# Patient Record
Sex: FEMALE | Race: BLACK OR AFRICAN AMERICAN | Employment: OTHER | URBAN - METROPOLITAN AREA
[De-identification: names, ages, dates, MRNs, and addresses within clinical notes are randomized per-mention and may not be internally consistent; named-entity substitution may affect disease eponyms.]

---

## 2017-07-11 ENCOUNTER — HOSPITAL ENCOUNTER (EMERGENCY)
Age: 77
Discharge: HOME | End: 2017-07-11
Attending: FAMILY MEDICINE

## 2017-07-11 VITALS
HEIGHT: 62 IN | WEIGHT: 220 LBS | OXYGEN SATURATION: 96 % | SYSTOLIC BLOOD PRESSURE: 203 MMHG | BODY MASS INDEX: 40.48 KG/M2 | TEMPERATURE: 97 F | HEART RATE: 80 BPM | RESPIRATION RATE: 16 BRPM | DIASTOLIC BLOOD PRESSURE: 79 MMHG

## 2017-07-11 DIAGNOSIS — M25.552 PAIN OF LEFT HIP JOINT: Primary | ICD-10-CM

## 2017-07-11 RX ORDER — LANOLIN ALCOHOL/MO/W.PET/CERES
CREAM (GRAM) TOPICAL
COMMUNITY
End: 2019-07-15

## 2017-07-11 NOTE — DISCHARGE INSTRUCTIONS
Hip Pain: Care Instructions  Your Care Instructions  Hip pain may be caused by many things, including overuse, a fall, or a twisting movement. Another cause of hip pain is arthritis. Your pain may increase when you stand up, walk, or squat. The pain may come and go or may be constant. Home treatment can help relieve hip pain, swelling, and stiffness. If your pain is ongoing, you may need more tests and treatment. Follow-up care is a key part of your treatment and safety. Be sure to make and go to all appointments, and call your doctor if you are having problems. Its also a good idea to know your test results and keep a list of the medicines you take. How can you care for yourself at home? · Take pain medicines exactly as directed. ¨ If the doctor gave you a prescription medicine for pain, take it as prescribed. ¨ If you are not taking a prescription pain medicine, ask your doctor if you can take an over-the-counter medicine. · Rest and protect your hip. Take a break from any activity, including standing or walking, that may cause pain. · Put ice or a cold pack against your hip for 10 to 20 minutes at a time. Try to do this every 1 to 2 hours for the next 3 days (when you are awake) or until the swelling goes down. Put a thin cloth between the ice and your skin. · Sleep on your healthy side with a pillow between your knees, or sleep on your back with pillows under your knees. · If there is no swelling, you can put moist heat, a heating pad, or a warm cloth on your hip. Do gentle stretching exercises to help keep your hip flexible. · Learn how to prevent falls. Have your vision and hearing checked regularly. Wear slippers or shoes with a nonskid sole. · Stay at a healthy weight. · Wear comfortable shoes. When should you call for help? Call 911 anytime you think you may need emergency care. For example, call if:  · You have sudden chest pain and shortness of breath, or you cough up blood.   · You are not able to stand or walk or bear weight. · Your buttocks, legs, or feet feel numb or tingly. · Your leg or foot is cool or pale or changes color. · You have severe pain. Call your doctor now or seek immediate medical care if:  · You have signs of infection, such as:  ¨ Increased pain, swelling, warmth, or redness in the hip area. ¨ Red streaks leading from the hip area. ¨ Pus draining from the hip area. ¨ A fever. · You have signs of a blood clot, such as:  ¨ Pain in your calf, back of the knee, thigh, or groin. ¨ Redness and swelling in your leg or groin. · You are not able to bend, straighten, or move your leg normally. · You have trouble urinating or having bowel movements. Watch closely for changes in your health, and be sure to contact your doctor if:  · You do not get better as expected. Where can you learn more? Go to http://shakila-candace.info/. Enter U629 in the search box to learn more about \"Hip Pain: Care Instructions. \"  Current as of: March 20, 2017  Content Version: 11.3  © 4995-3980 [employer]. Care instructions adapted under license by [facility] (which disclaims liability or warranty for this information). If you have questions about a medical condition or this instruction, always ask your healthcare professional. [place] [address] any warranty or liability for your use of this information.

## 2017-07-11 NOTE — UC PROVIDER NOTE
Patient is a 65 y.o. female presenting with hip pain. The history is provided by the patient. Hip Pain    This is a new problem. The current episode started more than 1 week ago. The problem occurs daily. The problem has been resolved. The pain is present in the left hip. The quality of the pain is described as aching. The patient is experiencing no pain. Pertinent negatives include no numbness, full range of motion, no stiffness, no tingling, no itching and no back pain. She has tried OTC pain medications for the symptoms. The treatment provided significant relief. There has been no history of extremity trauma. Past Medical History:   Diagnosis Date    Anemia, iron deficiency     Asthma     Diabetes ([address]     HTN (hypertension)     Hypercholesteremia         History reviewed. No pertinent surgical history. Family History   Problem Relation Age of Onset    Heart Attack      Hypertension      Diabetes          Social History     Social History    Marital status: SINGLE     Spouse name: N/A    Number of children: N/A    Years of education: N/A     Occupational History    Not on file. Social History Main Topics    Smoking status: Current Every Day Smoker     Packs/day: 0.50     Types: Cigarettes    Smokeless tobacco: Not on file    Alcohol use No    Drug use: No    Sexual activity: Not on file     Other Topics Concern    Not on file     Social History Narrative                ALLERGIES: Review of patient's allergies indicates no known allergies. Review of Systems   Constitutional: Negative for activity change. Musculoskeletal: Negative for back pain, gait problem and stiffness. Skin: Negative for itching. Neurological: Negative for tingling and numbness.        Vitals:    07/11/17 1905   BP: 178/79   Pulse: 80   Resp: 16   Temp: 97 °F (36.1 °C)   SpO2: 96%   Weight: 99.8 kg (220 lb)   Height: 5' 2\" (1.575 m)       Physical Exam   Constitutional: She is oriented to person, place, and time. She appears well-developed and well-nourished. Eyes: Conjunctivae and EOM are normal.   Pulmonary/Chest: Effort normal.   Musculoskeletal: Normal range of motion. She exhibits no tenderness. No pain in hip with passive ROM, internal and external rotation   Neurological: She is alert and oriented to person, place, and time. Skin: Skin is warm and dry. Psychiatric: She has a normal mood and affect. Her behavior is normal. Judgment and thought content normal.   Nursing note and vitals reviewed. MDM     Differential Diagnosis; Clinical Impression; Plan:     CLINICAL IMPRESSION:  Pain of left hip joint  (primary encounter diagnosis)    Plan:  1. Continue OTC Aspercream   2. Follow up with PCP  3. Risk of Significant Complications, Morbidity, and/or Mortality:   Presenting problems: Moderate  Diagnostic procedures: Moderate  Management options:   Moderate  Progress:   Patient progress:  Stable      Procedures

## 2018-06-10 ENCOUNTER — OFFICE VISIT (OUTPATIENT)
Dept: URGENT CARE | Age: 78
End: 2018-06-10

## 2018-06-10 VITALS
HEART RATE: 64 BPM | DIASTOLIC BLOOD PRESSURE: 85 MMHG | TEMPERATURE: 98.2 F | WEIGHT: 215 LBS | OXYGEN SATURATION: 98 % | HEIGHT: 62 IN | BODY MASS INDEX: 39.56 KG/M2 | RESPIRATION RATE: 18 BRPM | SYSTOLIC BLOOD PRESSURE: 210 MMHG

## 2018-06-10 DIAGNOSIS — M25.562 ACUTE PAIN OF LEFT KNEE: ICD-10-CM

## 2018-06-10 DIAGNOSIS — M17.0 PRIMARY OSTEOARTHRITIS OF BOTH KNEES: Primary | ICD-10-CM

## 2018-06-10 DIAGNOSIS — I10 MALIGNANT HYPERTENSION: ICD-10-CM

## 2018-06-10 RX ORDER — PREDNISONE 10 MG/1
TABLET ORAL
Qty: 21 TAB | Refills: 0 | Status: SHIPPED | OUTPATIENT
Start: 2018-06-10 | End: 2018-07-12 | Stop reason: ALTCHOICE

## 2018-06-10 RX ORDER — CLONIDINE HYDROCHLORIDE 0.1 MG/1
0.1 TABLET ORAL
Qty: 1 TAB | Refills: 0 | Status: SHIPPED | COMMUNITY
Start: 2018-06-10 | End: 2018-06-10

## 2018-06-10 RX ORDER — HYDROCHLOROTHIAZIDE 25 MG/1
25 TABLET ORAL DAILY
Qty: 30 TAB | Refills: 0 | Status: SHIPPED | OUTPATIENT
Start: 2018-06-10

## 2018-06-10 RX ORDER — AMLODIPINE BESYLATE 10 MG/1
10 TABLET ORAL DAILY
Qty: 30 TAB | Refills: 0 | Status: SHIPPED | OUTPATIENT
Start: 2018-06-10

## 2018-06-10 NOTE — PROGRESS NOTES
Patient is a 77 y.o. female presenting with knee pain. Knee Pain   This is a new problem. The current episode started more than 1 week ago. The problem occurs constantly. The problem has been gradually worsening. Associated symptoms comments: Knee swelling- stiffness- started after  Walking and long standing . The symptoms are aggravated by walking and standing. Nothing relieves the symptoms. She has tried nothing for the symptoms. Past Medical History:   Diagnosis Date    Anemia, iron deficiency     Asthma     Diabetes ([address]     HTN (hypertension)     Hypercholesteremia         History reviewed. No pertinent surgical history. Family History   Problem Relation Age of Onset    Heart Attack Other     Hypertension Other     Diabetes Other         Social History     Social History    Marital status: UNKNOWN     Spouse name: N/A    Number of children: N/A    Years of education: N/A     Occupational History    Not on file. Social History Main Topics    Smoking status: Current Every Day Smoker     Packs/day: 0.50     Types: Cigarettes    Smokeless tobacco: Never Used    Alcohol use No    Drug use: No    Sexual activity: Not on file     Other Topics Concern    Not on file     Social History Narrative                ALLERGIES: Review of patient's allergies indicates no known allergies. Review of Systems   All other systems reviewed and are negative. Vitals:    06/10/18 1320 06/10/18 1403   BP: (!) 204/84 (!) 210/85   Pulse: 64    Resp: 18    Temp: 98.2 °F (36.8 °C)    SpO2: 98%    Weight: 215 lb (97.5 kg)    Height: 5' 2\" (1.575 m)        Physical Exam   Musculoskeletal:        Right hip: Normal.        Left hip: Normal.        Right knee: She exhibits decreased range of motion and swelling. Tenderness found. Medial joint line tenderness noted. Left knee: She exhibits decreased range of motion, swelling and ecchymosis. Tenderness found. Medial joint line tenderness noted. MDM    Procedures      ICD-10-CM ICD-9-CM    1. Primary osteoarthritis of both knees M17.0 715.16 XR KNEE LT 3 V      XR KNEE RT 3 V   2. Acute pain of left knee M25.562 719.46    3. 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 Dispense:  30 Tab     Refill:  0    hydroCHLOROthiazide (HYDRODIURIL) 25 mg tablet     Sig: Take 1 Tab by mouth daily. Dispense:  30 Tab     Refill:  0    cloNIDine HCl (CATAPRES) 0.1 mg tablet     Sig: Take 1 Tab by mouth now for 1 dose. Dispense:  1 Tab     Refill:  0     Order Specific Question:   Expiration Date     Answer:   5/10/2019     Order Specific Question:   Lot#     Answer:   [id number]     Order Specific Question:        Answer:   teva     Order Specific Question:   NDC#     Answer:   [id number]     Results for orders placed or performed in visit on 06/10/18   XR KNEE RT 3 V    Narrative    EXAM:  XR KNEE RT 3 V    INDICATION:   pain. COMPARISON: None. FINDINGS: Three views of the right knee demonstrate no fracture or other acute  osseous or articular abnormality. There is a small effusion. Prominent medial  joint space narrowing and spurring. Prominent DJD patella      Impression    IMPRESSION:  No acute abnormality. Prominent DJD         XR KNEE LT 3 V    Narrative    EXAM:  XR KNEE LT 3 V    INDICATION:   pain. COMPARISON: None. FINDINGS: Three views of the left knee demonstrate no fracture or other acute  osseous or articular abnormality. There is a small effusion. Prominent medial  joint space narrowing, prominent DJD patella mild spurring of the tibial  tuberosity      Impression    IMPRESSION:  No acute abnormality.            The patients condition was discussed with the patient and they understand. The patient is to follow up with primary care doctor. If signs and symptoms become worse the pt is to go to the ER. The patient is to take medications as prescribed.

## 2018-06-10 NOTE — PATIENT INSTRUCTIONS
Knee Arthritis: Exercises  Your Care Instructions  Here are some examples of exercises for knee arthritis. Start each exercise slowly. Ease off the exercise if you start to have pain. Your doctor or physical therapist will tell you when you can start these exercises and which ones will work best for you. How to do the exercises  Knee flexion with heel slide    1. Lie on your back with your knees bent. 2. Slide your heel back by bending your affected knee as far as you can. Then hook your other foot around your ankle to help pull your heel even farther back. 3. Hold for about 6 seconds, then rest for up to 10 seconds. 4. Repeat 8 to 12 times. 5. Switch legs and repeat steps 1 through 4, even if only one knee is sore. Quad sets    1. Sit with your affected leg straight and supported on the floor or a firm bed. Place a small, rolled-up towel under your knee. Your other leg should be bent, with that foot flat on the floor. 2. Tighten the thigh muscles of your affected leg by pressing the back of your knee down into the towel. 3. Hold for about 6 seconds, then rest for up to 10 seconds. 4. Repeat 8 to 12 times. 5. Switch legs and repeat steps 1 through 4, even if only one knee is sore. Straight-leg raises to the front    1. Lie on your back with your good knee bent so that your foot rests flat on the floor. Your affected leg should be straight. Make sure that your low back has a normal curve. You should be able to slip your hand in between the floor and the small of your back, with your palm touching the floor and your back touching the back of your hand. 2. Tighten the thigh muscles in your affected leg by pressing the back of your knee flat down to the floor. Hold your knee straight. 3. Keeping the thigh muscles tight and your leg straight, lift your affected leg up so that your heel is about 12 inches off the floor. Hold for about 6 seconds, then lower slowly.   4. Relax for up to 10 seconds between repetitions. 5. Repeat 8 to 12 times. 6. Switch legs and repeat steps 1 through 5, even if only one knee is sore. Active knee flexion    1. Lie on your stomach with your knees straight. If your kneecap is uncomfortable, roll up a washcloth and put it under your leg just above your kneecap. 2. Lift the foot of your affected leg by bending the knee so that you bring the foot up toward your buttock. If this motion hurts, try it without bending your knee quite as far. This may help you avoid any painful motion. 3. Slowly move your leg up and down. 4. Repeat 8 to 12 times. 5. Switch legs and repeat steps 1 through 4, even if only one knee is sore. Quadriceps stretch (facedown)    1. Lie flat on your stomach, and rest your face on the floor. 2. Wrap a towel or belt strap around the lower part of your affected leg. Then use the towel or belt strap to slowly pull your heel toward your buttock until you feel a stretch. 3. Hold for about 15 to 30 seconds, then relax your leg against the towel or belt strap. 4. Repeat 2 to 4 times. 5. Switch legs and repeat steps 1 through 4, even if only one knee is sore. Stationary exercise bike    1. If you do not have a stationary exercise bike at home, you can find one to ride at your local health club or community center. 2. Adjust the height of the bike seat so that your knee is slightly bent when your leg is extended downward. If your knee hurts when the pedal reaches the top, you can raise the seat so that your knee does not bend as much. 3. Start slowly. At first, try to do 5 to 10 minutes of cycling with little to no resistance. Then increase your time and the resistance bit by bit until you can do 20 to 30 minutes without pain. 4. If you start to have pain, rest your knee until your pain gets back to the level that is normal for you. Or cycle for less time or with less effort. Follow-up care is a key part of your treatment and safety.  Be sure to make and go to all appointments, and call your doctor if you are having problems. It's also a good idea to know your test results and keep a list of the medicines you take. Where can you learn more? Go to http://shakila-candace.info/. Enter C159 in the search box to learn more about \"Knee Arthritis: Exercises. \"  Current as of: March 21, 2017  Content Version: 11.4  © 7671-5856 [employer]. Care instructions adapted under license by [employer] (which disclaims liability or warranty for this information). If you have questions about a medical condition or this instruction, always ask your healthcare professional. [address] any warranty or liability for your use of this information.

## 2018-06-10 NOTE — MR AVS SNAPSHOT
[address] [facility] [address] 
[phone] Patient: Adarsh Delgadillo MRN: [id number] YHI:[birth year] Visit Information Date & Time Provider Department Dept. Phone Encounter #  
 6/10/2018  1:00 PM [facility] [phone] [id number] Follow-up Instructions Return in about 1 week (around 6/17/2018), or if symptoms worsen or fail to improve, for Follow up with PCP. Upcoming Health Maintenance Date Due HEMOGLOBIN A1C Q6M 1939 LIPID PANEL Q1 1939 FOOT EXAM Q1 4/17/1949 MICROALBUMIN Q1 4/17/1949 EYE EXAM RETINAL OR DILATED Q1 4/17/1949 DTaP/Tdap/Td series (1 - Tdap) 4/17/1960 ZOSTER VACCINE AGE 60> 2/17/1999 GLAUCOMA SCREENING Q2Y 4/17/2004 Bone Densitometry (Dexa) Screening 4/17/2004 Pneumococcal 65+ Low/Medium Risk (2 of 2 - PPSV23) 10/22/2014 MEDICARE YEARLY EXAM 3/20/2018 Influenza Age 5 to Adult 8/1/2018 Allergies as of 6/10/2018  Review Complete On: 6/10/2018 By: Khalif Millard RN No Known Allergies Current Immunizations  Reviewed on 4/22/2010 Name Date Influenza Vaccine Whole 10/22/2009 ZZZ-RETIRED (DO NOT USE) Pneumococcal Vaccine (Unspecified Type) 10/22/2009 Not reviewed this visit You Were Diagnosed With   
  
 Codes Comments Primary osteoarthritis of both knees    -  Primary ICD-10-CM: M17.0 ICD-9-CM: 715.16 Acute pain of left knee     ICD-10-CM: M25.562 ICD-9-CM: 719.46 Malignant hypertension     ICD-10-CM: I10 
ICD-9-CM: 401.0 210/85 Vitals BP Pulse Temp Resp Height(growth percentile) Weight(growth percentile) (!) 210/85 64 98.2 °F (36.8 °C) 18 5' 2\" (1.575 m) 215 lb (97.5 kg) SpO2 BMI OB Status Smoking Status 98% 39.32 kg/m2 Postmenopausal Current Every Day Smoker Vitals History BMI and BSA Data  Body Mass Index Body Surface Area  
 39.32 kg/m 2 2.07 m 2  
  
  
 Preferred Pharmacy Pharmacy Name Phone [address] [facility] [address] [phone] Your Updated Medication List  
  
   
This list is accurate as of 6/10/18  2:22 PM.  Always use your most recent med list.  
  
  
  
  
 albuterol 90 mcg/actuation inhaler Commonly known as:  Josepha Kava Take 2 Puffs by inhalation. albuterol-ipratropium 2.5 mg-0.5 mg/3 ml Nebu Commonly known as:  DUONEB  
3 mL by Nebulization route four (4) times daily. amLODIPine 10 mg tablet Commonly known as:  Shelbie Mirta Take 1 Tab by mouth daily. budesonide 180 mcg/actuation Aepb inhaler Commonly known as:  PULMICORT Take 1 Puff by inhalation. cetirizine 10 mg tablet Commonly known as:  ZYRTEC Take  by mouth. FLOVENT DISKUS 100 mcg/actuation Dsdv Generic drug:  fluticasone Take  by inhalation. hydroCHLOROthiazide 25 mg tablet Commonly known as:  HYDRODIURIL Take 1 Tab by mouth daily. Iron 325 mg (65 mg iron) tablet Generic drug:  ferrous sulfate Take  by mouth Daily (before breakfast). metFORMIN 500 mg tablet Commonly known as:  GLUCOPHAGE Take 500 mg by mouth two (2) times daily (with meals). predniSONE 10 mg dose pack Commonly known as:  STERAPRED DS As directed SINGULAIR PO Take  by mouth. Prescriptions Sent to Pharmacy Refills  
 predniSONE (STERAPRED DS) 10 mg dose pack 0 Sig: As directed Class: Normal  
 Pharmacy: [facility] [address], [address] [facility] [place] Ph #: [phone]  
 amLODIPine (NORVASC) 10 mg tablet 0 Sig: Take 1 Tab by mouth daily. Class: Normal  
 Pharmacy: [facility] [address], [address] [place] [address] Ph #: [phone] Route: Oral  
 hydroCHLOROthiazide (HYDRODIURIL) 25 mg tablet 0 Sig: Take 1 Tab by mouth daily.   
 Class: Normal  
 Pharmacy: [facility] [address] [facility] [address] [facility], [address] [facility] #: [phone] Route: Oral  
  
Follow-up Instructions Return in about 1 week (around 6/17/2018), or if symptoms worsen or fail to improve, for Follow up with PCP. To-Do List   
 06/10/2018 Imaging:  XR KNEE LT 3 V   
  
 06/10/2018 Imaging:  XR KNEE RT 3 V Patient Instructions Knee Arthritis: Exercises Your Care Instructions Here are some examples of exercises for knee arthritis. Start each exercise slowly. Ease off the exercise if you start to have pain. Your doctor or physical therapist will tell you when you can start these exercises and which ones will work best for you. How to do the exercises Knee flexion with heel slide 1. Lie on your back with your knees bent. 2. Slide your heel back by bending your affected knee as far as you can. Then hook your other foot around your ankle to help pull your heel even farther back. 3. Hold for about 6 seconds, then rest for up to 10 seconds. 4. Repeat 8 to 12 times. 5. Switch legs and repeat steps 1 through 4, even if only one knee is sore. Vidyo 1. Sit with your affected leg straight and supported on the floor or a firm bed. Place a small, rolled-up towel under your knee. Your other leg should be bent, with that foot flat on the floor. 2. Tighten the thigh muscles of your affected leg by pressing the back of your knee down into the towel. 3. Hold for about 6 seconds, then rest for up to 10 seconds. 4. Repeat 8 to 12 times. 5. Switch legs and repeat steps 1 through 4, even if only one knee is sore. Straight-leg raises to the front 1. Lie on your back with your good knee bent so that your foot rests flat on the floor. Your affected leg should be straight. Make sure that your low back has a normal curve.  You should be able to slip your hand in between the floor and the small of your back, with your palm touching the floor and your back touching the back of your hand. 2. Tighten the thigh muscles in your affected leg by pressing the back of your knee flat down to the floor. Hold your knee straight. 3. Keeping the thigh muscles tight and your leg straight, lift your affected leg up so that your heel is about 12 inches off the floor. Hold for about 6 seconds, then lower slowly. 4. Relax for up to 10 seconds between repetitions. 5. Repeat 8 to 12 times. 6. Switch legs and repeat steps 1 through 5, even if only one knee is sore. Active knee flexion 1. Lie on your stomach with your knees straight. If your kneecap is uncomfortable, roll up a washcloth and put it under your leg just above your kneecap. 2. Lift the foot of your affected leg by bending the knee so that you bring the foot up toward your buttock. If this motion hurts, try it without bending your knee quite as far. This may help you avoid any painful motion. 3. Slowly move your leg up and down. 4. Repeat 8 to 12 times. 5. Switch legs and repeat steps 1 through 4, even if only one knee is sore. Quadriceps stretch (facedown) 1. Lie flat on your stomach, and rest your face on the floor. 2. Wrap a towel or belt strap around the lower part of your affected leg. Then use the towel or belt strap to slowly pull your heel toward your buttock until you feel a stretch. 3. Hold for about 15 to 30 seconds, then relax your leg against the towel or belt strap. 4. Repeat 2 to 4 times. 5. Switch legs and repeat steps 1 through 4, even if only one knee is sore. Stationary exercise bike 1. If you do not have a stationary exercise bike at home, you can find one to ride at your local health club or community center. 2. Adjust the height of the bike seat so that your knee is slightly bent when your leg is extended downward. If your knee hurts when the pedal reaches the top, you can raise the seat so that your knee does not bend as much. 3. Start slowly. At first, try to do 5 to 10 minutes of cycling with little to no resistance. Then increase your time and the resistance bit by bit until you can do 20 to 30 minutes without pain. 4. If you start to have pain, rest your knee until your pain gets back to the level that is normal for you. Or cycle for less time or with less effort. Follow-up care is a key part of your treatment and safety. Be sure to make and go to all appointments, and call your doctor if you are having problems. It's also a good idea to know your test results and keep a list of the medicines you take. Where can you learn more? Go to http://shakila-candace.info/. Enter C159 in the search box to learn more about \"Knee Arthritis: Exercises. \" Current as of: March 21, 2017 Content Version: 11.4 © 6938-8228 So Protect Me. Care instructions adapted under license by [employer] (which disclaims liability or warranty for this information). If you have questions about a medical condition or this instruction, always ask your healthcare professional. [address] any warranty or liability for your use of this information. Introducing [facility]! Carolina Schultz introduces [employer] patient portal. Now you can access parts of your medical record, email your doctor's office, and request medication refills online. 1. In your internet browser, go to [email or web address] 2. Click on the First Time User? Click Here link in the Sign In box. You will see the New Member Sign Up page. 3. Enter your [employer] Access Code exactly as it appears below. You will not need to use this code after youve completed the sign-up process. If you do not sign up before the expiration date, you must request a new code. · [employer] Access Code: [email or web address] Expires: 9/8/2018  1:00 PM 
 
4.  Enter the last four digits of your Social Security Number (xxxx) and Date of Birth (mm/dd/yyyy) as indicated and click Submit. You will be taken to the next sign-up page. 5. Create a [employer] ID. This will be your [employer] login ID and cannot be changed, so think of one that is secure and easy to remember. 6. Create a [employer] password. You can change your password at any time. 7. Enter your Password Reset Question and Answer. This can be used at a later time if you forget your password. 8. Enter your e-mail address. You will receive e-mail notification when new information is available in [address] 9. Click Sign Up. You can now view and download portions of your medical record. 10. Click the Download Summary menu link to download a portable copy of your medical information. If you have questions, please visit the Frequently Asked Questions section of the [employer] website. Remember, [employer] is NOT to be used for urgent needs. For medical emergencies, dial 911. Now available from your iPhone and Android! Please provide this summary of care documentation to your next provider. Your primary care clinician is listed as Phys Other. If you have any questions after today's visit, please call [phone].

## 2018-07-12 ENCOUNTER — OFFICE VISIT (OUTPATIENT)
Dept: INTERNAL MEDICINE CLINIC | Age: 78
End: 2018-07-12

## 2018-07-12 VITALS
DIASTOLIC BLOOD PRESSURE: 70 MMHG | RESPIRATION RATE: 18 BRPM | WEIGHT: 209.3 LBS | HEART RATE: 66 BPM | OXYGEN SATURATION: 97 % | BODY MASS INDEX: 38.52 KG/M2 | TEMPERATURE: 98.6 F | HEIGHT: 62 IN | SYSTOLIC BLOOD PRESSURE: 147 MMHG

## 2018-07-12 DIAGNOSIS — M17.11 PRIMARY OSTEOARTHRITIS OF RIGHT KNEE: ICD-10-CM

## 2018-07-12 DIAGNOSIS — M17.12 PRIMARY OSTEOARTHRITIS OF LEFT KNEE: Primary | ICD-10-CM

## 2018-07-12 PROBLEM — E66.01 SEVERE OBESITY (BMI 35.0-39.9): Status: ACTIVE | Noted: 2018-07-12

## 2018-07-12 RX ORDER — TRIAMCINOLONE ACETONIDE 40 MG/ML
40 INJECTION, SUSPENSION INTRA-ARTICULAR; INTRAMUSCULAR ONCE
Qty: 1 ML | Refills: 0
Start: 2018-07-12 | End: 2018-07-12

## 2018-07-12 NOTE — PROGRESS NOTES
Chief Complaint   Patient presents with    Knee Pain     she is a 77y.o. year old female who presents for evaluation of knee pain. Pain Assessment Encounter      Lily Horan  7/12/2018  Onset of Symptoms: years   ________________________________________________________________________  Description: Patient states she was diagnosed with arthritis in the knees. Patient states it hurts most when she sits for a long time and then gets up. Frequency: more than 5 times a day  Pain Scale:(1-10): 3  Trauma Hx: none  Hx of similar symptoms: Yes  Radiation: NO,   Duration:  intermittent      Progression: is unchanged  What makes it better?: ice, OTC meds and rest  What makes it worse?:walking and certain movements  Medications tried: acetaminophen, ibuprofen    Reviewed and agree with Nurse Note and duplicated in this note. Reviewed PmHx, RxHx, FmHx, SocHx, AllgHx and updated and dated in the chart.     Family History   Problem Relation Age of Onset    Heart Attack Other     Hypertension Other     Diabetes Other        Past Medical History:   Diagnosis Date    Anemia, iron deficiency     Asthma     Diabetes ([address]     HTN (hypertension)     Hypercholesteremia       Social History     Social History    Marital status: UNKNOWN     Spouse name: N/A    Number of children: N/A    Years of education: N/A     Social History Main Topics    Smoking status: Current Every Day Smoker     Packs/day: 0.50     Types: Cigarettes    Smokeless tobacco: Never Used    Alcohol use No    Drug use: No    Sexual activity: Not Asked     Other Topics Concern    None     Social History Narrative        Review of Systems - negative except as listed above      Objective:     Vitals:    07/12/18 1404   BP: 147/70   Pulse: 66   Resp: 18   Temp: 98.6 °F (37 °C)   TempSrc: Oral   SpO2: 97%   Weight: 209 lb 4.8 oz (94.9 kg)   Height: 5' 2\" (1.575 m)       Physical Examination: General appearance - alert, well appearing, and in no distress  Back exam - full range of motion, no tenderness, palpable spasm or pain on motion  Neurological - alert, oriented, normal speech, no focal findings or movement disorder noted  Musculoskeletal - left knee exam:  The patient'sleft Knee  is  normal to inspection. The ROM is normal and there is flexion to 90 Effusion is: moderate The joint exhibits absent warmth and Crepitus is: moderate. The Solange test is:  Negative Joint Line Tenderness is  Positive medial.  The Thessaly Test is Negative  and the Lachman is  negative. The Anterior Drawer is Negative. The Posterior Drawer is:  negative. Valgus Stress (for MCL) is:  normal . Varus Stress (for LCL) is  normal  . The Miriam Test is negative and the Apprehension Sign:  negative  Patellar Grind Negative and Tracking is normal  Extremities - peripheral pulses normal, no pedal edema, no clubbing or cyanosis  Skin - normal coloration and turgor, no rashes, no suspicious skin lesions noted  Time Out taken at:  2:22 PM  7/12/2018    * Patient was identified by name and date of birth   * Agreement on procedure being performed was verified  * Risks and Benefits explained to the patient  * Procedure site verified and marked as necessary  * Patient was positioned for comfort  * Consent was signed and verified   In the presence of: Witness: Chucky Marcum lpn  Injection #: 1  Needle:  18 gauge  Procedure: This procedure was discussed with Martina Meadows and other therapeutic options were considered (risks vs benefits). Martina Meadows and I thought that an injection was merited. After informed consent was obtained, landmarks were identified(marked), and the left joint  was cleansed with ChlorPrep in the standard sterile manner. 3mL  1% lidocaine  and  1mL Kenalog  was then injected and needle tenotomy was not performed. Procedure performed with ultrasound needle guidance. The needle was then withdrawn. T sade procedure was well tolerated.   The patient is asked to continue to rest the area for a few more days before resuming regular activities. It may be more painful for the first 1-2 days. NSAIDS are to be avoided. Watch for fever, or increased swelling or persistent pain in the joint. Call or return to clinic prn if such symptoms occur or there is failure to improve as anticipated. The procedure did provide relief of symptoms in the clinic. RTC in 4 weeks for reevaluation and possible reinjection. Given the patient's body habitus and the anatomically deep nature of this structure, sonographic guidance is recommended to prevent injury to neurovascular structures and confirm accuracy of injection. Furthermore, this patient has failed conservative treatment with physical therapy and modalities and the diagnostic and therapeutic accuracy is important. Assessment/ Plan:   Diagnoses and all orders for this visit:    1. Primary osteoarthritis of left knee  -     TRIAMCINOLONE ACETONIDE INJ  -     triamcinolone acetonide (KENALOG) 40 mg/mL injection; 1 mL by Intra artICUlar route once for 1 dose. - 20606 - DRAIN/INJECT INTERMEDIATE JOINT/BURSA WITH US  -     REFERRAL TO PHYSICAL THERAPY    2. Primary osteoarthritis of right knee  -     TRIAMCINOLONE ACETONIDE INJ  -     triamcinolone acetonide (KENALOG) 40 mg/mL injection; 1 mL by Intra artICUlar route once for 1 dose.   - 20606 - DRAIN/INJECT INTERMEDIATE JOINT/BURSA WITH US  -     REFERRAL TO PHYSICAL THERAPY       Pathophysiology, recovery and rehabilitation process discussed and questions answered   Counseling for 30 Minutes of the total visit duration   Pictures and figures used as necessary   Provided reassurance   Monitor response to injection   Discussed steroid side effects of fat atrophy, hypopigmentation, steroid flare or infection   Monitor response to Physical Therapy   Recommend  lower impact activities-walking, Eliptical, Nordic Track, cycling or swimming   Follow up in 4 week(s)     Patient was informed/counseled on: Body mass index is 38.28 kg/(m^2). 1) Remember to stay active and/or exercise regularly (I suggest 30-45 minutes daily)   2) For reliable dietary information, go to www. EATRIGHT.org. You may wish to consider seeing the nutritionist at [facility] [phone], also consider the [address] I have discussed the diagnosis with the patient and the intended plan as seen in the above orders. The patient has received an after-visit summary and questions were answered concerning future plans. Medication Side Effects and Warnings were discussed with patient: yes  Patient Labs were reviewed and or requested: yes  Patient Past Records were reviewed and or requested  yes  I have discussed the diagnosis with the patient and the intended plan as seen in the above orders. The patient has received an after-visit summary and questions were answered concerning future plans. Pt agrees to call or return to clinic and/or go to closest ER with any worsening of symptoms. This may include, but not limited to increased fever (>100.4) with NSAIDS or Tylenol, increased edema, confusion, rash, worsening of presenting symptoms.

## 2018-07-12 NOTE — MR AVS SNAPSHOT
Shanaenancy Reiswin 
 
 
 [facility] [facility] [address] [address] 
[phone] Patient: Cassandra Rivera MRN: [id number] IMO:8/92/1677 Visit Information Date & Time Provider Department Dept. Phone Encounter #  
 7/12/2018  1:45 PM [address] [place] [address] MD Rigoberto Infante [facility] and [address] 34 [id number] Upcoming Health Maintenance Date Due HEMOGLOBIN A1C Q6M 1939 LIPID PANEL Q1 1939 FOOT EXAM Q1 4/17/1949 MICROALBUMIN Q1 4/17/1949 EYE EXAM RETINAL OR DILATED Q1 4/17/1949 DTaP/Tdap/Td series (1 - Tdap) 4/17/1960 ZOSTER VACCINE AGE 60> 2/17/1999 GLAUCOMA SCREENING Q2Y 4/17/2004 Bone Densitometry (Dexa) Screening 4/17/2004 Pneumococcal 65+ Low/Medium Risk (2 of 2 - PPSV23) 10/22/2014 MEDICARE YEARLY EXAM 3/20/2018 Influenza Age 5 to Adult 8/1/2018 Allergies as of 7/12/2018  Review Complete On: 7/12/2018 By: [address] [facility] MD Arelis  
 No Known Allergies Current Immunizations  Reviewed on 4/22/2010 Name Date Influenza Vaccine Whole 10/22/2009 ZZZ-RETIRED (DO NOT USE) Pneumococcal Vaccine (Unspecified Type) 10/22/2009 Not reviewed this visit You Were Diagnosed With   
  
 Codes Comments Primary osteoarthritis of left knee    -  Primary ICD-10-CM: M17.12 
ICD-9-CM: 715.16 Primary osteoarthritis of right knee     ICD-10-CM: M17.11 ICD-9-CM: 715.16 Vitals BP Pulse Temp Resp Height(growth percentile) Weight(growth percentile) 147/70 (BP 1 Location: Right arm, BP Patient Position: Sitting) 66 98.6 °F (37 °C) (Oral) 18 5' 2\" (1.575 m) 209 lb 4.8 oz (94.9 kg) SpO2 BMI OB Status Smoking Status 97% 38.28 kg/m2 Postmenopausal Current Every Day Smoker Vitals History BMI and BSA Data Body Mass Index Body Surface Area  
 38.28 kg/m 2 2.04 m 2 Preferred Pharmacy Pharmacy Name Phone  [facility], [address] [place] [address] [phone] Your Updated Medication List  
  
   
This list is accurate as of 7/12/18  2:38 PM.  Always use your most recent med list.  
  
  
  
  
 albuterol 90 mcg/actuation inhaler Commonly known as:  Jyothi Deep Take 2 Puffs by inhalation. albuterol-ipratropium 2.5 mg-0.5 mg/3 ml Nebu Commonly known as:  DUONEB  
3 mL by Nebulization route four (4) times daily. amLODIPine 10 mg tablet Commonly known as:  Magi Spruce Take 1 Tab by mouth daily. budesonide 180 mcg/actuation Aepb inhaler Commonly known as:  PULMICORT Take 1 Puff by inhalation. cetirizine 10 mg tablet Commonly known as:  ZYRTEC Take  by mouth. FLOVENT DISKUS 100 mcg/actuation Dsdv Generic drug:  fluticasone Take  by inhalation. hydroCHLOROthiazide 25 mg tablet Commonly known as:  HYDRODIURIL Take 1 Tab by mouth daily. Iron 325 mg (65 mg iron) tablet Generic drug:  ferrous sulfate Take  by mouth Daily (before breakfast). metFORMIN 500 mg tablet Commonly known as:  GLUCOPHAGE Take 500 mg by mouth two (2) times daily (with meals). SINGULAIR PO Take  by mouth.  
  
 triamcinolone acetonide 40 mg/mL injection Commonly known as:  KENALOG  
1 mL by Intra artICUlar route once for 1 dose. We Performed the Following LA ARTHROCENTESIS ASPIR&/INJ INTERM JT/BURS W/US [20657 CPT(R)] REFERRAL TO PHYSICAL THERAPY [YGZ54 Custom] TRIAMCINOLONE ACETONIDE INJ [ [facility]] Referral Information Referral ID Referred By Referred To  
  
 [id number] JENNIFER CARTER [facility] OP PT   
   [address] [facility] [address] [facility] Phone: [phone] Fax: [phone] Visits Status Start Date End Date  
 20 Open 7/12/18 7/12/19 If your referral has a status of pending review or denied, additional information will be sent to support the outcome of this decision. Introducing [facility]! [facility] introduces [employer] patient portal. Now you can access parts of your medical record, email your doctor's office, and request medication refills online. 1. In your internet browser, go to [email or web address] 2. Click on the First Time User? Click Here link in the Sign In box. You will see the New Member Sign Up page. 3. Enter your [employer] Access Code exactly as it appears below. You will not need to use this code after youve completed the sign-up process. If you do not sign up before the expiration date, you must request a new code. · [employer] Access Code: [email or web address] Expires: 9/8/2018  1:00 PM 
 
4. Enter the last four digits of your Social Security Number (xxxx) and Date of Birth (mm/dd/yyyy) as indicated and click Submit. You will be taken to the next sign-up page. 5. Create a [employer] ID. This will be your [employer] login ID and cannot be changed, so think of one that is secure and easy to remember. 6. Create a [employer] password. You can change your password at any time. 7. Enter your Password Reset Question and Answer. This can be used at a later time if you forget your password. 8. Enter your e-mail address. You will receive e-mail notification when new information is available in [address] 9. Click Sign Up. You can now view and download portions of your medical record. 10. Click the Download Summary menu link to download a portable copy of your medical information. If you have questions, please visit the Frequently Asked Questions section of the [employer] website. Remember, [employer] is NOT to be used for urgent needs. For medical emergencies, dial 911. Now available from your iPhone and Android! Please provide this summary of care documentation to your next provider. Your primary care clinician is listed as Phys Other. If you have any questions after today's visit, please call [phone].

## 2018-08-01 ENCOUNTER — HOSPITAL ENCOUNTER (OUTPATIENT)
Dept: PHYSICAL THERAPY | Age: 78
Discharge: HOME | End: 2018-08-01
Payer: MEDICARE

## 2018-08-01 PROCEDURE — G8979 MOBILITY GOAL STATUS: HCPCS

## 2018-08-01 PROCEDURE — 97110 THERAPEUTIC EXERCISES: CPT

## 2018-08-01 PROCEDURE — 97161 PT EVAL LOW COMPLEX 20 MIN: CPT

## 2018-08-01 PROCEDURE — G8978 MOBILITY CURRENT STATUS: HCPCS

## 2018-08-01 NOTE — PROGRESS NOTES
PT INITIAL EVALUATION NOTE - [facility] 2-15 Patient Name: Twan Lino Date:2018 : 1940 [x]  Patient  Verified Payor: [place] / Plan: Sherif Rodriguez / Product Type: Medicare / In time: 3:05 PM  Out time: 4:00 PM 
Total Treatment Time (min): 55 minutes Total Timed Codes (min): 15 minutes 1:1 Treatment Time ( only): 55 minutes Visit #: 1 Treatment Area: Bilateral knee pain [M25.561, M25.562] SUBJECTIVE Pain Level (0-10 scale): 0/10 Any medication changes, allergies to medications, adverse drug reactions, diagnosis change, or new procedure performed?: [] No    [x] Yes (see summary sheet for update) Subjective:   
Bilateral knee pain that began in 2018 (L>R) that began insidiously. The Pt is along there inferior border of the patella and along the patella bilaterally. She denies any numbness, tingling, shooting pains, or weakness in her LEs. She reports that her main complaint at this time is stiffness in her knees after sitting for > 5 minutes. She states that the pain and stiffness dissipates quickly after she gets moving. Aggravating factors include: sitting for long periods, going up and down stairs, and walking for long periods. Relieving factors include: rest and aspercreme with lidocaine. She got an injection in the L knee on 18 and she reports that the pain has completely dissipated in the L knee and she only has stiffness. Since the injection, she has noticed mild pain in the R knee with the aggravating factors. She is independent with all ADLs. She is retired. She sleeps well; she sleeps on her R side and has to use a CPAP machine. She lives in [facility] with the bedroom on the 2nd floor- pain going up and down the stairs. PMH: bone spur in \"L foot\", HTN (controlled), diabetes (controlled). She takes Aleve PRN for pain. OBJECTIVE/EXAMINATION Posture:  Slouching Other Observations:  Bilateral pes planus Gait and Functional Mobility: Uncompensated R Trendelenburg gait, decreased heel strike bilaterally, decreased knee flexion during swing bilaterally Lumbar ROM: 
 Flexion: Moderate Extension: Moderate Side Bending: Right: Mild   Left: Mild Rotation: Right: Mild    Left: Mild Balance Assessment: Mild deficits in balance and neuromuscular control in single limb stance bilaterally Squat Assessment: 
 Double Leg Forward trunk lean, quadriceps dominant Single Leg NT Neurological: Sensations: Intact to light touch sensation L1-S1 bilaterally Flexibility: Moderate restrictions in hamstrings, hip internal and external rotators, quadriceps, iliopsoas, and gastrocnemius/soleus complex bilaterally Right Knee:  AROM +2-105 Left  Knee:  AROM +1-108 LOWER QUARTER   MUSCLE STRENGTH 
KEY       R  L 
0 - No Contraction  Hip flex  4  4 
1 - Trace          er    4-  4- 
2 - Poor          ir   4  4 
3 - Fair           abd  3+  4- 
4 - Good          ext  3+  3+ 5 - Normal   Knee flex  4  4 Ext  4+  4+ Ankle DF  4+  4+ 
              PF  4+  4+ Gluteal activation: The Pt has improper gluteal activation with hip extension bilaterally Joint Mobility Assessment: Decreased patellar mobility (superior, inferior, medial, and lateral) bilaterally Palpation: TTP along medial and lateral knee joint lines bilaterally Special Tests:Varus: (-) B     SLR: (-) B Valgus: (-) B     Slump: NT 
  Solange's: (-) B    Ely's: (+) B Anterior Drawer: NT    Obers: NT 
  Posterior Drawer: NT    Clonus: NT 
  Lachman's: NT 
 
 
15 min Therapeutic Exercise:  [x] See flow sheet :  
Rationale: increase ROM, increase strength, improve coordination, improve balance and increase proprioception to improve the patients ability to perform ADLs with less pain or discomfort. With 
 [x] TE 
 [] TA 
 [] neuro [x] other: Patient Education: [x] Review HEP [] Progressed/Changed HEP based on:  
[] positioning [] body mechanics   [] transfers   [] heat/ice application   
[x] other: Pt educated on diagnosis and prognosis with therapy. Other Objective/Functional Measures:FOTO 52/100 Pain Level (0-10 scale) post treatment: 0/10 ASSESSMENT/Changes in Function:  
 
[x]  See Plan of Care Arron Johns, PT 8/1/2018  3:05 PM

## 2018-08-01 NOTE — PROGRESS NOTES
[facility] [employer] [facility] [address] Phone: [phone] Fax: [phone] Plan of Care/Statement of Necessity for Physical Therapy Services  2-15 Patient name: Cassandra Rivera  : 1940  Provider#: [id number] Referral source: Diane Thomas MD     
Medical/Treatment Diagnosis: Bilateral knee pain [M25.561, M25.562] Prior Hospitalization: see medical history Comorbidities: Allergies, arthritis, asthma, BMI over 30, diabetes type I or II, HTN Prior Level of Function: The patient completed 20 minutes of exercise seldom or never Medications: Verified on Patient Summary List 
Start of Care: 18      Onset Date: 2018 The Plan of Care and following information is based on the information from the initial evaluation. Assessment/ key information: The Pt is a pleasant 77year old female who presents to therapy with complaints of bilateral knee pain (L>R) secondary to OA. Based on examination, the Pt presents with decreased hip strength and stability, decreased core strength and stability, decreased knee ROM, decreased patellar mobility, decreased balance and neuromuscular control, gait impairments, restrictions in her hip musculature flexibility, and decreased activity tolerance and endurance. The patient would benefit from skilled physical therapy to help improve the above listed impairments to allow the patient to safely return to their prior level of function with less overall pain or risk of further injury. The patient has a good prognosis with skilled physical therapy.  
 
Evaluation Complexity History MEDIUM  Complexity : 1-2 comorbidities / personal factors will impact the outcome/ POC ; Examination HIGH Complexity : 4+ Standardized tests and measures addressing body structure, function, activity limitation and / or participation in recreation  ;Presentation LOW Complexity : Stable, uncomplicated  ;Clinical Decision Making MEDIUM Complexity : FOTO score of 26-74 Overall Complexity Rating: LOW Problem List: pain affecting function, decrease ROM, decrease strength, impaired gait/ balance, decrease ADL/ functional abilitiies, decrease activity tolerance, decrease flexibility/ joint mobility and decrease transfer abilities Treatment Plan may include any combination of the following: Therapeutic exercise, Therapeutic activities, Neuromuscular re-education, Physical agent/modality, Gait/balance training, Manual therapy, Patient education, Self Care training, Functional mobility training, Home safety training and Stair training Patient / Family readiness to learn indicated by: asking questions and interest 
Persons(s) to be included in education: patient (P) Barriers to Learning/Limitations: None Patient Goal (s): stop stiffness and pain Patient Self Reported Health Status: good Rehabilitation Potential: good Short Term Goals: To be accomplished in 6 treatments: 
1. The Pt will be independent and compliant with their HEP. 2. The Pt will report a 50% reduction in their pain with ADLs. Long Term Goals: To be accomplished in 12 treatments: 
1. The Pt will score the MCII on her FOTO survey demonstrating improved overall function (52 to 58 points). 2. The Pt will be able to stand after sitting for >/= 10 minutes without reports of stiffness to allow the Pt to be able to perform transfers without discomfort. 3. The Pt will be able to safely navigate 1 standard flight of stairs with 0-2/10 pain to allow the Pt to be able to navigate stairs with less pain or discomfort. Frequency / Duration: Patient to be seen 1-2 times per week for 12 treatments. Patient/ Caregiver education and instruction: self care, activity modification and exercises 
 
[x]  Plan of care has been reviewed with GILDA 
 
G-Codes (GP) Mobility  Current  CK= 40-59%   Goal  CJ= 20-39% The severity rating is based on clinical judgment and the FOTO Score score. Certification Period: 8/1/18-11/1/18 Rosette Preciado, PT 8/1/2018 4:06 PM 
 
________________________________________________________________________ I certify that the above Therapy Services are being furnished while the patient is under my care. I agree with the treatment plan and certify that this therapy is necessary. [de-identified] Signature:____________________  Date:____________Time: _________

## 2018-08-11 ENCOUNTER — OFFICE VISIT (OUTPATIENT)
Dept: URGENT CARE | Age: 78
End: 2018-08-11

## 2018-08-11 VITALS
HEIGHT: 60 IN | SYSTOLIC BLOOD PRESSURE: 181 MMHG | HEART RATE: 79 BPM | TEMPERATURE: 98.7 F | RESPIRATION RATE: 18 BRPM | OXYGEN SATURATION: 97 % | BODY MASS INDEX: 42.41 KG/M2 | DIASTOLIC BLOOD PRESSURE: 78 MMHG | WEIGHT: 216 LBS

## 2018-08-11 DIAGNOSIS — M25.531 RIGHT WRIST PAIN: ICD-10-CM

## 2018-08-11 DIAGNOSIS — M25.511 BILATERAL SHOULDER PAIN, UNSPECIFIED CHRONICITY: Primary | ICD-10-CM

## 2018-08-11 DIAGNOSIS — M25.512 BILATERAL SHOULDER PAIN, UNSPECIFIED CHRONICITY: Primary | ICD-10-CM

## 2018-08-11 NOTE — MR AVS SNAPSHOT
[address] [place] [address] 
[phone] Patient: Romeo Bhandari MRN: [id number] SR Visit Information Date & Time Provider Department Dept. Phone Encounter #  
 2018  4:45 PM [address] [facility] [phone] [id number] Upcoming Health Maintenance Date Due HEMOGLOBIN A1C Q6M 1940 LIPID PANEL Q1 1940 FOOT EXAM Q1 1949 MICROALBUMIN Q1 1949 EYE EXAM RETINAL OR DILATED Q1 1949 DTaP/Tdap/Td series (1 - Tdap) 1960 ZOSTER VACCINE AGE 60> 1999 GLAUCOMA SCREENING Q2Y 2004 Bone Densitometry (Dexa) Screening 2004 Pneumococcal 65+ Low/Medium Risk (2 of 2 - PPSV23) 10/22/2014 MEDICARE YEARLY EXAM 3/20/2018 Influenza Age 5 to Adult 2018 Allergies as of 2018  Review Complete On: 2018 By: Kulwant Phelps RN No Known Allergies Current Immunizations  Reviewed on 2010 Name Date Influenza Vaccine Whole 10/22/2009 ZZZ-RETIRED (DO NOT USE) Pneumococcal Vaccine (Unspecified Type) 10/22/2009 Not reviewed this visit You Were Diagnosed With   
  
 Codes Comments Bilateral shoulder pain, unspecified chronicity    -  Primary ICD-10-CM: M25.511, M25.512 ICD-9-CM: 719.41 Right wrist pain     ICD-10-CM: M25.531 ICD-9-CM: 719.43 Vitals BP Pulse Temp Resp Height(growth percentile) Weight(growth percentile) 181/78 79 98.7 °F (37.1 °C) 18 5' (1.524 m) 216 lb (98 kg) SpO2 BMI OB Status Smoking Status 97% 42.18 kg/m2 Postmenopausal Current Every Day Smoker BMI and BSA Data Body Mass Index Body Surface Area  
 42.18 kg/m 2 2.04 m 2 Preferred Pharmacy Pharmacy Name Phone [address] [facility] [address] [phone] Your Updated Medication List  
  
   
 This list is accurate as of 8/11/18  5:42 PM.  Always use your most recent med list.  
  
  
  
  
 albuterol 90 mcg/actuation inhaler Commonly known as:  Cesar Laroche Take 2 Puffs by inhalation. albuterol-ipratropium 2.5 mg-0.5 mg/3 ml Nebu Commonly known as:  DUONEB  
3 mL by Nebulization route four (4) times daily. amLODIPine 10 mg tablet Commonly known as:  Corina Darting Take 1 Tab by mouth daily. budesonide 180 mcg/actuation Aepb inhaler Commonly known as:  PULMICORT Take 1 Puff by inhalation. cetirizine 10 mg tablet Commonly known as:  ZYRTEC Take  by mouth. FLOVENT DISKUS 100 mcg/actuation Dsdv Generic drug:  fluticasone Take  by inhalation. hydroCHLOROthiazide 25 mg tablet Commonly known as:  HYDRODIURIL Take 1 Tab by mouth daily. Iron 325 mg (65 mg iron) tablet Generic drug:  ferrous sulfate Take  by mouth Daily (before breakfast). metFORMIN 500 mg tablet Commonly known as:  GLUCOPHAGE Take 500 mg by mouth two (2) times daily (with meals). SINGULAIR PO Take  by mouth. To-Do List   
 08/11/2018 Imaging:  XR SHOULDER LT AP/LAT MIN 2 V   
  
 08/11/2018 Imaging:  XR SHOULDER RT AP/LAT MIN 2 V   
  
 08/11/2018 Imaging:  XR WRIST RT AP/LAT/OBL MIN 3V   
  
 08/15/2018 10:00 AM  
  Appointment with Francy Gonzalez at [facility] ([phone]) Please remember to arrive at the hospital at least 30 minutes prior to your scheduled appointment time. When you come in for your appointment, please be sure to bring the therapy prescription the doctor gave you, your insurance card, and a list of the medicines you are taking. Also, please remember to wear comfortable, loose- fitting clothes. We look forward to seeing you. Patient Instructions Arthritis: Care Instructions Your Care Instructions Arthritis, also called osteoarthritis, is a breakdown of the cartilage that cushions your joints. When the cartilage wears down, your bones rub against each other. This causes pain and stiffness. Many people have some arthritis as they age. Arthritis most often affects the joints of the spine, hands, hips, knees, or feet. You can take simple measures to protect your joints, ease your pain, and help you stay active. Follow-up care is a key part of your treatment and safety. Be sure to make and go to all appointments, and call your doctor if you are having problems. It's also a good idea to know your test results and keep a list of the medicines you take. How can you care for yourself at home? · Stay at a healthy weight. Being overweight puts extra strain on your joints. · Talk to your doctor or physical therapist about exercises that will help ease joint pain. ¨ Stretch. You may enjoy gentle forms of yoga to help keep your joints and muscles flexible. ¨ Walk instead of jog. Other types of exercise that are less stressful on the joints include riding a bicycle, swimming, chelita chi, or water exercise. ¨ Lift weights. Strong muscles help reduce stress on your joints. Stronger thigh muscles, for example, take some of the stress off of the knees and hips. Learn the right way to lift weights so you do not make joint pain worse. · Take your medicines exactly as prescribed. Call your doctor if you think you are having a problem with your medicine. · Take pain medicines exactly as directed. ¨ If the doctor gave you a prescription medicine for pain, take it as prescribed. ¨ If you are not taking a prescription pain medicine, ask your doctor if you can take an over-the-counter medicine. · Use a cane, crutch, walker, or another device if you need help to get around. These can help rest your joints. You also can use other things to make life easier, such as a higher toilet seat and padded handles on kitchen utensils. · Do not sit in low chairs, which can make it hard to get up. · Put heat or cold on your sore joints as needed. Use whichever helps you most. You also can take turns with hot and cold packs. ¨ Apply heat 2 or 3 times a day for 20 to 30 minutes-using a heating pad, hot shower, or hot pack-to relieve pain and stiffness. ¨ Put ice or a cold pack on your sore joint for 10 to 20 minutes at a time. Put a thin cloth between the ice and your skin. When should you call for help? Call your doctor now or seek immediate medical care if: 
  · You have sudden swelling, warmth, or pain in any joint.  
  · You have joint pain and a fever or rash.  
  · You have such bad pain that you cannot use a joint.  
 Watch closely for changes in your health, and be sure to contact your doctor if: 
  · You have mild joint symptoms that continue even with more than 6 weeks of care at home.  
  · You have stomach pain or other problems with your medicine. Where can you learn more? Go to http://shakila-candace.info/. Enter W353 in the search box to learn more about \"Arthritis: Care Instructions. \" Current as of: October 10, 2017 Content Version: 11.7 © 1813-5133 [place]. Care instructions adapted under license by [employer] (which disclaims liability or warranty for this information). If you have questions about a medical condition or this instruction, always ask your healthcare professional. [address] any warranty or liability for your use of this information. Introducing [facility]! Dominic Caldwell introduces [employer] Mobile patient portal. Now you can access parts of your medical record, email your doctor's office, and request medication refills online. 1. In your internet browser, go to [email or web address] 2. Click on the First Time User? Click Here link in the Sign In box. You will see the New Member Sign Up page. 3. Enter your [employer] Mobile Access Code exactly as it appears below.  You will not need to use this code after youve completed the sign-up process. If you do not sign up before the expiration date, you must request a new code. · [employer] Access Code: [email or web address] Expires: 9/8/2018  1:00 PM 
 
4. Enter the last four digits of your Social Security Number (xxxx) and Date of Birth (mm/dd/yyyy) as indicated and click Submit. You will be taken to the next sign-up page. 5. Create a [employer]vance ID. This will be your [employer] login ID and cannot be changed, so think of one that is secure and easy to remember. 6. Create a [employer] password. You can change your password at any time. 7. Enter your Password Reset Question and Answer. This can be used at a later time if you forget your password. 8. Enter your e-mail address. You will receive e-mail notification when new information is available in [address]. 9. Click Sign Up. You can now view and download portions of your medical record. 10. Click the Download Summary menu link to download a portable copy of your medical information. If you have questions, please visit the Frequently Asked Questions section of the [employer] website. Remember, [employer] is NOT to be used for urgent needs. For medical emergencies, dial 911. Now available from your iPhone and Android! Please provide this summary of care documentation to your next provider. Your primary care clinician is listed as Phys Other. If you have any questions after today's visit, please call [phone].

## 2018-08-11 NOTE — PATIENT INSTRUCTIONS
Arthritis: Care Instructions  Your Care Instructions  Arthritis, also called osteoarthritis, is a breakdown of the cartilage that cushions your joints. When the cartilage wears down, your bones rub against each other. This causes pain and stiffness. Many people have some arthritis as they age. Arthritis most often affects the joints of the spine, hands, hips, knees, or feet. You can take simple measures to protect your joints, ease your pain, and help you stay active. Follow-up care is a key part of your treatment and safety. Be sure to make and go to all appointments, and call your doctor if you are having problems. It's also a good idea to know your test results and keep a list of the medicines you take. How can you care for yourself at home? · Stay at a healthy weight. Being overweight puts extra strain on your joints. · Talk to your doctor or physical therapist about exercises that will help ease joint pain. ¨ Stretch. You may enjoy gentle forms of yoga to help keep your joints and muscles flexible. ¨ Walk instead of jog. Other types of exercise that are less stressful on the joints include riding a bicycle, swimming, chelita chi, or water exercise. ¨ Lift weights. Strong muscles help reduce stress on your joints. Stronger thigh muscles, for example, take some of the stress off of the knees and hips. Learn the right way to lift weights so you do not make joint pain worse. · Take your medicines exactly as prescribed. Call your doctor if you think you are having a problem with your medicine. · Take pain medicines exactly as directed. ¨ If the doctor gave you a prescription medicine for pain, take it as prescribed. ¨ If you are not taking a prescription pain medicine, ask your doctor if you can take an over-the-counter medicine. · Use a cane, crutch, walker, or another device if you need help to get around. These can help rest your joints.  You also can use other things to make life easier, such as a higher toilet seat and padded handles on kitchen utensils. · Do not sit in low chairs, which can make it hard to get up. · Put heat or cold on your sore joints as needed. Use whichever helps you most. You also can take turns with hot and cold packs. ¨ Apply heat 2 or 3 times a day for 20 to 30 minutes-using a heating pad, hot shower, or hot pack-to relieve pain and stiffness. ¨ Put ice or a cold pack on your sore joint for 10 to 20 minutes at a time. Put a thin cloth between the ice and your skin. When should you call for help? Call your doctor now or seek immediate medical care if:    · You have sudden swelling, warmth, or pain in any joint.     · You have joint pain and a fever or rash.     · You have such bad pain that you cannot use a joint.    Watch closely for changes in your health, and be sure to contact your doctor if:    · You have mild joint symptoms that continue even with more than 6 weeks of care at home.     · You have stomach pain or other problems with your medicine. Where can you learn more? Go to http://shakila-candace.info/. Enter W895 in the search box to learn more about \"Arthritis: Care Instructions. \"  Current as of: October 10, 2017  Content Version: 11.7  © 0319-6827 [employer]. Care instructions adapted under license by [employer] (which disclaims liability or warranty for this information). If you have questions about a medical condition or this instruction, always ask your healthcare professional. [place] [address] any warranty or liability for your use of this information.

## 2018-08-12 NOTE — PROGRESS NOTES
Patient is a 77 y.o. female presenting with shoulder pain and wrist pain. The history is provided by the patient. History limited by: nothing. Shoulder Pain   Pertinent negatives include no chest pain, no abdominal pain and no shortness of breath. Wrist Pain   Pertinent negatives include no chest pain, no abdominal pain and no shortness of breath. Maury Mcclendon is a 77 y.o. female presenting to clinic today with BL shoulder pain and right wrist pain that began this week. She states that she is right-handed. Denies any trauma, and states that her pain is worse at night and is improved when she is under a warm shower. Currently reports 0/10 pain, but states that her pain is 8/10 at night. Reports taking aleve with relief also. Denies numbness or tingling of the upper extremities, denies weakness on one side. Denies other complaint today. Past Medical History:   Diagnosis Date    Anemia, iron deficiency     Asthma     Diabetes ([facility] [address]     HTN (hypertension)     Hypercholesteremia         No past surgical history on file. Family History   Problem Relation Age of Onset    Heart Attack Other     Hypertension Other     Diabetes Other         Social History     Social History    Marital status: UNKNOWN     Spouse name: N/A    Number of children: N/A    Years of education: N/A     Occupational History    Not on file. Social History Main Topics    Smoking status: Current Every Day Smoker     Packs/day: 0.50     Types: Cigarettes    Smokeless tobacco: Never Used    Alcohol use No    Drug use: No    Sexual activity: Not on file     Other Topics Concern    Not on file     Social History Narrative                ALLERGIES: Review of patient's allergies indicates no known allergies. Review of Systems   Constitutional: Negative for chills and fever. HENT: Negative for congestion, rhinorrhea, sinus pressure, sneezing and sore throat. Eyes: Negative for pain.    Respiratory: Negative for cough, chest tightness and shortness of breath. Cardiovascular: Negative for chest pain. Gastrointestinal: Negative for abdominal distention, abdominal pain, nausea and vomiting. Endocrine: Negative for cold intolerance. Genitourinary: Negative for dysuria. Musculoskeletal: Negative for back pain. Skin: Negative for color change. Neurological: Negative for dizziness. Vitals:    08/11/18 1642   BP: 181/78   Pulse: 79   Resp: 18   Temp: 98.7 °F (37.1 °C)   SpO2: 97%   Weight: 216 lb (98 kg)   Height: 5' (1.524 m)       Physical Exam   Constitutional: She is oriented to person, place, and time. She appears well-developed and well-nourished. No distress. HENT:   Head: Normocephalic and atraumatic. Eyes: EOM are normal.   Neck: Normal range of motion. Neck supple. Cardiovascular: Normal rate and regular rhythm. Pulmonary/Chest: Effort normal and breath sounds normal. No respiratory distress. Abdominal: Soft. Bowel sounds are normal. She exhibits no distension. There is no tenderness. Musculoskeletal: Normal range of motion. BL shoulders: no swelling, no ecchymosis, nontender. Hang symmetrically. Full ROM. Right wrist: tender on pisiform bone, full ROM, no swelling, no ecchymosis, no wound. Radial pulses 2+BL   strength equal BL. Sensation intact to fingertips. Neurological: She is alert and oriented to person, place, and time. Skin: Skin is warm and dry. She is not diaphoretic. Psychiatric: She has a normal mood and affect. Her behavior is normal. Judgment and thought content normal.   Vitals reviewed. MDM  CLINICAL IMPRESSION:  1. Bilateral shoulder pain, unspecified chronicity    2. Right wrist pain        Plan:  Patient presenting to clinic with atraumatic pain to BL shoulders and to right wrist that occurs at night, none at present. Exam benign. BP noted to be elevated, patient asymptomatic and has known HTN. 1. Xrays of BL shoulders and right wrist negative. Suspect arthritis. 2. Advised patient to continue aleve since this is working. 3. Follow up with PCP for chronic management. 4. Go to ED with worsening symptoms or any new symptoms.     Procedures

## 2018-08-15 ENCOUNTER — HOSPITAL ENCOUNTER (OUTPATIENT)
Dept: PHYSICAL THERAPY | Age: 78
Discharge: HOME | End: 2018-08-15
Payer: MEDICARE

## 2018-08-15 PROCEDURE — 97110 THERAPEUTIC EXERCISES: CPT

## 2018-08-15 NOTE — PROGRESS NOTES
PT DAILY TREATMENT NOTE - [facility] 2-15    Patient Name: Cassandra Rivera  Date:8/15/2018  : 1940  [x]  Patient  Verified  Payor: Milly Ice / Plan: [place] / Product Type: Medicare /    In time:1000  Out time:1057  Total Treatment Time (min): 57  Total Timed Codes (min): 57  1:1 Treatment Time ( only): 35   Visit #: 2     Treatment Area: Bilateral knee pain [M25.561, M25.562]    SUBJECTIVE  Pain Level (0-10 scale): 2-3/10  Any medication changes, allergies to medications, adverse drug reactions, diagnosis change, or new procedure performed?: [x] No    [] Yes (see summary sheet for update)  Subjective functional status/changes:   [] No changes reported  Patient reports once she gets moving her pain in the knee begins to lessen. Towards the end of the day continues to be the most painful. OBJECTIVE    57 min Therapeutic Exercise:  [x] See flow sheet :   Rationale: increase ROM and increase strength to improve the patients ability to perform ADLs and reduce pain levels    With   [x] TE   [] TA   [] neuro   [] other: Patient Education: [x] Review HEP    [] Progressed/Changed HEP based on:   [] positioning   [] body mechanics   [] transfers   [] heat/ice application    [] other:      Other Objective/Functional Measures: none noted     Pain Level (0-10 scale) post treatment: 0/10    ASSESSMENT/Changes in Function:   Patient requires verbal and visual cues in order to properly perform therex. Required multiple rest breaks due to fatigue. Will continue to progress therex as tolerated. Patient will continue to benefit from skilled PT services to modify and progress therapeutic interventions, address functional mobility deficits, address ROM deficits, address strength deficits, analyze and address soft tissue restrictions, analyze and cue movement patterns and analyze and modify body mechanics/ergonomics to attain remaining goals.      [x]  See Plan of Care  []  See progress note/recertification  [] See Discharge Summary         Progress towards goals / Updated goals:  Patient is progressing well towards goals, will continue to strengthen the hip stabilizers in order to reduce pain levels    PLAN  [x]  Upgrade activities as tolerated     [x]  Continue plan of care  [x]  Update interventions per flow sheet       []  Discharge due to:_  []  Other:_      Rita Bravo 8/15/2018  10:22 AM

## 2018-08-21 ENCOUNTER — HOSPITAL ENCOUNTER (OUTPATIENT)
Dept: PHYSICAL THERAPY | Age: 78
Discharge: HOME | End: 2018-08-21
Payer: MEDICARE

## 2018-08-21 PROCEDURE — 97110 THERAPEUTIC EXERCISES: CPT

## 2018-08-21 NOTE — PROGRESS NOTES
PT DAILY TREATMENT NOTE - [facility] 2-15    Patient Name: Deion Bowden  Date:2018  : 1940  [x]  Patient  Verified  Payor: [place] MEDICARE / Plan: [place] MEDICARE PART A & B / Product Type: Medicare /    In time:1104  Out time:1156  Total Treatment Time (min): 52  Total Timed Codes (min): 52  1:1 Treatment Time ( [address] only): 37   Visit #: 3     Treatment Area: Bilateral knee pain [M25.561, M25.562]    SUBJECTIVE  Pain Level (0-10 scale): 0/10  Any medication changes, allergies to medications, adverse drug reactions, diagnosis change, or new procedure performed?: [x] No    [] Yes (see summary sheet for update)  Subjective functional status/changes:   [] No changes reported  Patient reports her knee pain has begun to diminish in her L knee, however her R knee continues to ache. Pain is primarily during the night. Patient reports she has been able to complete her therex at least once a day. OBJECTIVE    52 min Therapeutic Exercise:  [x] See flow sheet :   Rationale: increase ROM and increase strength to improve the patients ability to perform ADLs and reduce pain levels    With   [] TE   [] TA   [] neuro   [] other: Patient Education: [x] Review HEP    [] Progressed/Changed HEP based on:   [] positioning   [] body mechanics   [] transfers   [] heat/ice application    [] other:      Other Objective/Functional Measures: none noted     Pain Level (0-10 scale) post treatment: 0/10    ASSESSMENT/Changes in Function:   Patient experiences slight pain on the lateral side of the R knee. Patient has difficulty with bed mobility. Requires VC in order to reduce pain levels. Will continue to progress therex as tolerated.   Patient will continue to benefit from skilled PT services to modify and progress therapeutic interventions, address functional mobility deficits, address ROM deficits, address strength deficits, analyze and address soft tissue restrictions, analyze and cue movement patterns and analyze and modify body mechanics/ergonomics to attain remaining goals.      [x]  See Plan of Care  []  See progress note/recertification  []  See Discharge Summary         Progress towards goals / Updated goals:  Patient is progressing towards goals, will continue to strengthen hip stabilizers in order to reduce pain levels    PLAN  [x]  Upgrade activities as tolerated     [x]  Continue plan of care  [x]  Update interventions per flow sheet       []  Discharge due to:_  []  Other:_      Usman Yeboah 8/21/2018  11:23 AM

## 2018-08-24 ENCOUNTER — HOSPITAL ENCOUNTER (OUTPATIENT)
Dept: PHYSICAL THERAPY | Age: 78
Discharge: HOME | End: 2018-08-24
Payer: MEDICARE

## 2018-08-24 PROCEDURE — 97110 THERAPEUTIC EXERCISES: CPT

## 2018-08-24 NOTE — PROGRESS NOTES
PT DAILY TREATMENT NOTE - [facility] 2-15    Patient Name: Burtis Soulier  Date:2018  : 1940  [x]  Patient  Verified  Payor: [place] MEDICARE / Plan: [place] MEDICARE PART A & B / Product Type: Medicare /    In time:1008  Out time:1103  Total Treatment Time (min): 55  Total Timed Codes (min): 55  1:1 Treatment Time ( [address] only): 31   Visit #: 4     Treatment Area: Bilateral knee pain [M25.561, M25.562]    SUBJECTIVE  Pain Level (0-10 scale): 5/10  Any medication changes, allergies to medications, adverse drug reactions, diagnosis change, or new procedure performed?: [x] No    [] Yes (see summary sheet for update)  Subjective functional status/changes:   [] No changes reported  Patient reports she has been feeling stiff but believes it is due to her being inactive. OBJECTIVE    55 min Therapeutic Exercise:  [] See flow sheet :   Rationale: increase ROM and increase strength to improve the patients ability to perform ADLs and reduce pain levels    With   [] TE   [] TA   [] neuro   [] other: Patient Education: [x] Review HEP    [] Progressed/Changed HEP based on:   [] positioning   [] body mechanics   [] transfers   [] heat/ice application    [] other:      Other Objective/Functional Measures: none noted     Pain Level (0-10 scale) post treatment: 0/10    ASSESSMENT/Changes in Function:   Patient shows poor quad contractibility. Difficulty with laying completely prone. Requires multiple VC in order to properly perform therex correctly. Will continue to progress therex as tolerated. Patient will continue to benefit from skilled PT services to modify and progress therapeutic interventions, address functional mobility deficits, address ROM deficits, address strength deficits, analyze and address soft tissue restrictions, analyze and cue movement patterns, analyze and modify body mechanics/ergonomics and assess and modify postural abnormalities to attain remaining goals.      [x]  See Plan of Care  []  See progress note/recertification  []  See Discharge Summary         Progress towards goals / Updated goals:  Patient is progressing towards goals, will continue to strengthen the hip stabilizers in order to reduce stress applied to the knee.     PLAN  [x]  Upgrade activities as tolerated     [x]  Continue plan of care  [x]  Update interventions per flow sheet       []  Discharge due to:_  []  Other:_      Lazara Mendoza 8/24/2018  10:29 AM

## 2018-08-28 ENCOUNTER — HOSPITAL ENCOUNTER (OUTPATIENT)
Dept: PHYSICAL THERAPY | Age: 78
Discharge: HOME | End: 2018-08-28
Payer: MEDICARE

## 2018-08-28 PROCEDURE — 97110 THERAPEUTIC EXERCISES: CPT

## 2018-08-28 NOTE — PROGRESS NOTES
PT DAILY TREATMENT NOTE - [facility] 2-15 Patient Name: Kaela Villa Date:2018 : 1940 [x]  Patient  Verified Payor: [place] MEDICARE / Plan: Sherif Rodriguez / Product Type: Medicare / In time:1008  Out time:1101 Total Treatment Time (min): 53 Total Timed Codes (min): 53 
1:1 Treatment Time ( [address] only): 53 Visit #: 3 Treatment Area: Bilateral knee pain [M25.561, M25.562] SUBJECTIVE Pain Level (0-10 scale): 4-5/10 Any medication changes, allergies to medications, adverse drug reactions, diagnosis change, or new procedure performed?: [x] No    [] Yes (see summary sheet for update) Subjective functional status/changes:   [] No changes reported Patient reports on  she was very active at [employer] and didn't feel much pain in her knee. The next day however she was in severe pain and needed to go to the doctor to help manage it where he gave her a shot and pain medication. OBJECTIVE 53 min Therapeutic Exercise:  [x] See flow sheet :  
Rationale: increase ROM and increase strength to improve the patients ability to perform ADLs and reduce pain levels With 
 [x] TE 
 [] TA 
 [] neuro 
 [] other: Patient Education: [x] Review HEP [] Progressed/Changed HEP based on:  
[] positioning   [] body mechanics   [] transfers   [] heat/ice application   
[] other:   
 
Other Objective/Functional Measures: none noted Pain Level (0-10 scale) post treatment: 0/10 ASSESSMENT/Changes in Function:  
Patient continues to experience pain in the R knee due to tightness in the hamstrings. Limited ROM in the RLE due to hamstring restrictions. Able to maintain pelvic stability with prone hip extension. Will continue to progress therex as tolerated.  
Patient will continue to benefit from skilled PT services to modify and progress therapeutic interventions, address functional mobility deficits, address ROM deficits, address strength deficits, analyze and address soft tissue restrictions, analyze and cue movement patterns and analyze and modify body mechanics/ergonomics to attain remaining goals. [x]  See Plan of Care 
[]  See progress note/recertification 
[]  See Discharge Summary Progress towards goals / Updated goals: 
Patient is progressing towards goals, will continue to improve tissue extensibility in order to reduce pain levels. PLAN [x]  Upgrade activities as tolerated     [x]  Continue plan of care [x]  Update interventions per flow sheet      
[]  Discharge due to:_ 
[]  Other:_ Diamond LLANES Isom 8/28/2018  10:27 AM

## 2018-08-29 ENCOUNTER — OFFICE VISIT (OUTPATIENT)
Dept: INTERNAL MEDICINE CLINIC | Age: 78
End: 2018-08-29

## 2018-08-29 VITALS
RESPIRATION RATE: 16 BRPM | HEART RATE: 82 BPM | SYSTOLIC BLOOD PRESSURE: 162 MMHG | DIASTOLIC BLOOD PRESSURE: 78 MMHG | TEMPERATURE: 98.4 F | WEIGHT: 202.6 LBS | BODY MASS INDEX: 39.78 KG/M2 | OXYGEN SATURATION: 97 % | HEIGHT: 60 IN

## 2018-08-29 DIAGNOSIS — M17.11 PRIMARY OSTEOARTHRITIS OF RIGHT KNEE: Primary | ICD-10-CM

## 2018-08-29 RX ORDER — TRIAMCINOLONE ACETONIDE 40 MG/ML
40 INJECTION, SUSPENSION INTRA-ARTICULAR; INTRAMUSCULAR ONCE
Qty: 1 ML | Refills: 0
Start: 2018-08-29 | End: 2018-08-29

## 2018-08-29 NOTE — MR AVS SNAPSHOT
[address] [facility]. [address] [address] 
[phone] Patient: Deion Bowden MRN: [id number] LIY:[birth year] Visit Information Date & Time Provider Department Dept. Phone Encounter #  
 8/29/2018  8:45 AM [facility] [address] [id number] Upcoming Health Maintenance Date Due HEMOGLOBIN A1C Q6M 1940 LIPID PANEL Q1 1940 FOOT EXAM Q1 4/17/1949 MICROALBUMIN Q1 4/17/1949 EYE EXAM RETINAL OR DILATED Q1 4/17/1949 DTaP/Tdap/Td series (1 - Tdap) 4/17/1960 ZOSTER VACCINE AGE 60> 2/17/1999 GLAUCOMA SCREENING Q2Y 4/17/2004 Bone Densitometry (Dexa) Screening 4/17/2004 Pneumococcal 65+ Low/Medium Risk (2 of 2 - PPSV23) 10/22/2014 MEDICARE YEARLY EXAM 3/20/2018 Influenza Age 5 to Adult 8/1/2018 Allergies as of 8/29/2018  Review Complete On: 8/29/2018 By: Keon Molina MD  
 No Known Allergies Current Immunizations  Reviewed on 4/22/2010 Name Date Influenza Vaccine Whole 10/22/2009 ZZZ-RETIRED (DO NOT USE) Pneumococcal Vaccine (Unspecified Type) 10/22/2009 Not reviewed this visit You Were Diagnosed With   
  
 Codes Comments Primary osteoarthritis of right knee    -  Primary ICD-10-CM: M17.11 ICD-9-CM: 715.16 Vitals BP Pulse Temp Resp Height(growth percentile) Weight(growth percentile) 162/78 (BP 1 Location: Right arm, BP Patient Position: Sitting) 82 98.4 °F (36.9 °C) (Oral) 16 5' (1.524 m) 202 lb 9.6 oz (91.9 kg) SpO2 BMI OB Status Smoking Status 97% 39.57 kg/m2 Postmenopausal Current Every Day Smoker Vitals History BMI and BSA Data Body Mass Index Body Surface Area  
 39.57 kg/m 2 1.97 m 2 Preferred Pharmacy Pharmacy Name Phone [facility] [employer] [address] [place] [address] [phone] Your Updated Medication List  
  
   
 This list is accurate as of 8/29/18  9:13 AM.  Always use your most recent med list.  
  
  
  
  
 albuterol 90 mcg/actuation inhaler Commonly known as:  Yamilka Jag Take 2 Puffs by inhalation. albuterol-ipratropium 2.5 mg-0.5 mg/3 ml Nebu Commonly known as:  DUONEB  
3 mL by Nebulization route four (4) times daily. amLODIPine 10 mg tablet Commonly known as:  Maximilian Yfn Take 1 Tab by mouth daily. budesonide 180 mcg/actuation Aepb inhaler Commonly known as:  PULMICORT Take 1 Puff by inhalation. cetirizine 10 mg tablet Commonly known as:  ZYRTEC Take  by mouth. FLOVENT DISKUS 100 mcg/actuation Dsdv Generic drug:  fluticasone Take  by inhalation. hydroCHLOROthiazide 25 mg tablet Commonly known as:  HYDRODIURIL Take 1 Tab by mouth daily. Iron 325 mg (65 mg iron) tablet Generic drug:  ferrous sulfate Take  by mouth Daily (before breakfast). metFORMIN 500 mg tablet Commonly known as:  GLUCOPHAGE Take 500 mg by mouth two (2) times daily (with meals). SINGULAIR PO Take  by mouth.  
  
 triamcinolone acetonide 40 mg/mL injection Commonly known as:  KENALOG  
1 mL by Intra artICUlar route once for 1 dose. We Performed the Following DC ARTHROCENTESIS ASPIR&/INJ INTERM JT/BURS W/US [94602 CPT(R)] TRIAMCINOLONE ACETONIDE INJ [ hospitals] To-Do List   
 08/31/2018 11:00 AM  
  Appointment with Katia Hanna at [facility] ([phone]) Please remember to arrive at the hospital at least 30 minutes prior to your scheduled appointment time. When you come in for your appointment, please be sure to bring the therapy prescription the doctor gave you, your insurance card, and a list of the medicines you are taking. Also, please remember to wear comfortable, loose- fitting clothes. We look forward to seeing you. Introducing [facility]! [employer] introduces [employer] patient portal. Now you can access parts of your medical record, email your doctor's office, and request medication refills online. 1. In your internet browser, go to [email or web address] [employer] 2. Click on the First Time User? Click Here link in the Sign In box. You will see the New Member Sign Up page. 3. Enter your [employer] Access Code exactly as it appears below. You will not need to use this code after youve completed the sign-up process. If you do not sign up before the expiration date, you must request a new code. · [employer] Access Code: [email or web address] Expires: 9/8/2018  1:00 PM 
 
4. Enter the last four digits of your Social Security Number (xxxx) and Date of Birth (mm/dd/yyyy) as indicated and click Submit. You will be taken to the next sign-up page. 5. Create a [employer] ID. This will be your [employer] login ID and cannot be changed, so think of one that is secure and easy to remember. 6. Create a [employer] password. You can change your password at any time. 7. Enter your Password Reset Question and Answer. This can be used at a later time if you forget your password. 8. Enter your e-mail address. You will receive e-mail notification when new information is available in [address] 9. Click Sign Up. You can now view and download portions of your medical record. 10. Click the Download Summary menu link to download a portable copy of your medical information. If you have questions, please visit the Frequently Asked Questions section of the [employer] website. Remember, [employer] is NOT to be used for urgent needs. For medical emergencies, dial 911. Now available from your iPhone and Android! Please provide this summary of care documentation to your next provider. Your primary care clinician is listed as Phys Other. If you have any questions after today's visit, please call [phone].

## 2018-08-29 NOTE — PROGRESS NOTES
Chief Complaint Patient presents with  Knee Pain  
 
she is a 77y.o. year old female who presents for evaluation of right knee pain Pain Assessment Encounter Andres Liang 8/29/2018 Onset of Symptoms: 2 months 
________________________________________________________________________ Description: Patient states that when she is still or sitting for a while her knee gets stiff and achy. Patient states when she starts walking then she will be fine. Frequency: more than 5 times a day Pain Scale:(1-10): 8 Trauma Hx: none Hx of similar symptoms: Yes Radiation: NO, Duration:  intermittent Progression: is unchanged What makes it better?: ice, OTC meds and walking What makes it worse?:rest and sitting Medications tried: acetaminophen, ibuprofen Reviewed and agree with Nurse Note and duplicated in this note. Reviewed PmHx, RxHx, FmHx, SocHx, AllgHx and updated and dated in the chart. Family History Problem Relation Age of Onset  Heart Attack Other  Hypertension Other  Diabetes Other Past Medical History:  
Diagnosis Date  Anemia, iron deficiency  Asthma  Diabetes ([address]  HTN (hypertension)  Hypercholesteremia Social History Social History  Marital status: UNKNOWN Spouse name: N/A  
 Number of children: N/A  
 Years of education: N/A Social History Main Topics  Smoking status: Current Every Day Smoker Packs/day: 0.50 Types: Cigarettes  Smokeless tobacco: Never Used  Alcohol use No  
 Drug use: No  
 Sexual activity: Not on file Other Topics Concern  Not on file Social History Narrative Review of Systems - negative except as listed above Objective:  
 
Vitals:  
 08/29/18 0849 BP: 162/78 Pulse: 82 Resp: 16 Temp: 98.4 °F (36.9 °C) TempSrc: Oral  
SpO2: 97% Weight: 202 lb 9.6 oz (91.9 kg) Height: 5' (1.524 m) Physical Examination: General appearance - alert, well appearing, and in no distress Back exam - full range of motion, no tenderness, palpable spasm or pain on motion Neurological - alert, oriented, normal speech, no focal findings or movement disorder noted Musculoskeletal -  right knee exam: 
The patient'sleft Knee  is  normal to inspection. The ROM is normal and there is flexion to 90 Effusion is: moderate The joint exhibits absent warmth and Crepitus is: moderate. The Solange test is:  Negative Joint Line Tenderness is  Positive medial.  The Thessaly Test is Negative  and the Lachman is  negative. The Anterior Drawer is Negative. The Posterior Drawer is:  negative. Valgus Stress (for MCL) is:  normal . Varus Stress (for LCL) is  normal  . The Miriam Test is negative and the Apprehension Sign:  negative Patellar Grind Negative and Tracking is normal 
Extremities - peripheral pulses normal, no pedal edema, no clubbing or cyanosis Skin - normal coloration and turgor, no rashes, no suspicious skin lesions noted Time Out taken at: 
9:25 AM 
8/29/2018 * Patient was identified by name and date of birth * Agreement on procedure being performed was verified * Risks and Benefits explained to the patient * Procedure site verified and marked as necessary * Patient was positioned for comfort * Consent was signed and verified In the presence of: Witness: Gabo Chavez Injection #: 1 Needle:  22 gauge Procedure: This procedure was discussed with Lazarus Marry and other therapeutic options were considered (risks vs benefits). Lazarus Marry and I thought that an injection was merited. After informed consent was obtained, landmarks were identified(marked), and the right joint  was cleansed with ChlorPrep in the standard sterile manner. 4mL  1% lidocaine  and  1mL Kenalog  was then injected and needle tenotomy was not performed. Procedure performed with ultrasound needle guidance. The needle was then withdrawn. T he procedure was well tolerated. The patient is asked to continue to rest the area for a few more days before resuming regular activities. It may be more painful for the first 1-2 days. NSAIDS are to be avoided. Watch for fever, or increased swelling or persistent pain in the joint. Call or return to clinic prn if such symptoms occur or there is failure to improve as anticipated. The procedure did provide relief of symptoms in the clinic. RTC in 4 weeks for reevaluation and possible reinjection. Given the patient's body habitus and the anatomically deep nature of this structure, sonographic guidance is recommended to prevent injury to neurovascular structures and confirm accuracy of injection. Furthermore, this patient has failed conservative treatment with physical therapy and modalities and the diagnostic and therapeutic accuracy is important. Assessment/ Plan:  
Diagnoses and all orders for this visit: 1. Primary osteoarthritis of right knee -     TRIAMCINOLONE ACETONIDE INJ 
-     triamcinolone acetonide (KENALOG) 40 mg/mL injection; 1 mL by Intra artICUlar route once for 1 dose. -     [address]606 - DRAIN/INJECT INTERMEDIATE JOINT/BURSA WITH US Pathophysiology, recovery and rehabilitation process discussed and questions answered Counseling for 30 Minutes of the total visit duration Pictures and figures used as necessary Provided reassurance Monitor response to Physical Therapy Recommend activity modification Recommend  lower impact activities-walking, Eliptical, [employer], cycling or swimming Follow up in 4 week(s) Patient was informed/counseled on: Body mass index is 39.57 kg/(m^2). Discussed the patient's I have reviewed/discussed the above normal BMI with the patient. I have recommended the following interventions: dietary management education, guidance, and counseling . 1) Remember to stay active and/or exercise regularly (I suggest 30-45 minutes daily) 2) For reliable dietary information, go to www. [employer]AT[employer].org. You may wish to consider seeing the nutritionist at [facility] [phone], also consider the [address] I have discussed the diagnosis with the patient and the intended plan as seen in the above orders. The patient has received an after-visit summary and questions were answered concerning future plans. Medication Side Effects and Warnings were discussed with patient: yes Patient Labs were reviewed and or requested: yes Patient Past Records were reviewed and or requested  yes I have discussed the diagnosis with the patient and the intended plan as seen in the above orders. The patient has received an after-visit summary and questions were answered concerning future plans. Pt agrees to call or return to clinic and/or go to closest ER with any worsening of symptoms. This may include, but not limited to increased fever (>100.4) with NSAIDS or Tylenol, increased edema, confusion, rash, worsening of presenting symptoms.

## 2018-08-31 ENCOUNTER — HOSPITAL ENCOUNTER (OUTPATIENT)
Dept: PHYSICAL THERAPY | Age: 78
Discharge: HOME | End: 2018-08-31
Payer: MEDICARE

## 2018-08-31 PROCEDURE — 97110 THERAPEUTIC EXERCISES: CPT

## 2018-08-31 NOTE — PROGRESS NOTES
PT DAILY TREATMENT NOTE - [facility] 2-15 Patient Name: Romeo Bhandari Date:2018 : 1940 [x]  Patient  Verified Payor: [place] / Plan: Sherif Burroughs [facility] / Product Type: Medicare / In time:1110  Out time:1203 Total Treatment Time (min): 57 Total Timed Codes (min): 57 
1:1 Treatment Time ( [address] only): 57 Visit #: 6 Treatment Area: Bilateral knee pain [M25.561, M25.562] SUBJECTIVE Pain Level (0-10 scale): 0/10 Any medication changes, allergies to medications, adverse drug reactions, diagnosis change, or new procedure performed?: [x] No    [] Yes (see summary sheet for update) Subjective functional status/changes:   [] No changes reported Patient received a cortizone shot in her L knee on Tuesday and reports she has been feeling much better, but still experience stiffness and discomfort in her R. She believes she is 60% better. OBJECTIVE 57 min Therapeutic Exercise:  [x] See flow sheet :  
Rationale: increase ROM and increase strength to improve the patients ability to perform ADLs and reduce pain levels. With 
 [x] TE 
 [] TA 
 [] neuro 
 [] other: Patient Education: [x] Review HEP [] Progressed/Changed HEP based on:  
[] positioning   [] body mechanics   [] transfers   [] heat/ice application   
[] other:   
 
Other Objective/Functional Measures: FOTO:64 Lumbar ROM: 
                      Flexion: Moderate Extension: Moderate Side Bending: Right: Moderate                                                          Left:Moderate Rotation: Right:Moderate                                                                             Left: Moderate 
  
  
Right Knee:  AROM +1-108 Left  Knee:  AROM 0-110               
  
 
Pain Level (0-10 scale) post treatment: 0/10 ASSESSMENT/Changes in Function:  
Patient continues to experience slight pain behind the knee while performing clamshells. Difficulty with maintaining knee extension bilaterally while performing prone hip extension. Will plan on adding two more visits and then D/C. Patient will continue to benefit from skilled PT services to modify and progress therapeutic interventions, address functional mobility deficits, address ROM deficits, address strength deficits, analyze and address soft tissue restrictions, analyze and cue movement patterns and analyze and modify body mechanics/ergonomics to attain remaining goals. []  See Plan of Care [x]  See progress note/recertification 
[]  See Discharge Summary Progress towards goals / Updated goals: 
Short Term Goals: To be accomplished in 6 treatments: 
1. The Pt will be independent and compliant with their HEP. met 2. The Pt will report a 50% reduction in their pain with ADLs. met Long Term Goals: To be accomplished in 12 treatments: 
1. The Pt will score the MCII on her FOTO survey demonstrating improved overall function (52 to 58 points). met 2. The Pt will be able to stand after sitting for >/= 10 minutes without reports of stiffness to allow the Pt to be able to perform transfers without discomfort. met 3. The Pt will be able to safely navigate 1 standard flight of stairs with 0-2/10 pain to allow the Pt to be able to navigate stairs with less pain or discomfort. progressing PLAN [x]  Upgrade activities as tolerated     [x]  Continue plan of care [x]  Update interventions per flow sheet      
[]  Discharge due to:_ 
[]  Other:_ Ulisses Friday T Tricia 8/31/2018  11:22 AM

## 2018-09-04 ENCOUNTER — HOSPITAL ENCOUNTER (OUTPATIENT)
Dept: PHYSICAL THERAPY | Age: 78
Discharge: HOME | End: 2018-09-04
Payer: MEDICARE

## 2018-09-04 PROCEDURE — 97110 THERAPEUTIC EXERCISES: CPT

## 2018-09-04 NOTE — PROGRESS NOTES
PT DAILY TREATMENT NOTE - [facility] 2-15 Patient Name: Ian Avendano Date:2018 : 1940 [x]  Patient  Verified Payor: [place] / Plan: Sherif Burroughs y / Product Type: Medicare / In time:233  Out time:329 Total Treatment Time (min): 56 Total Timed Codes (min): 56 
1:1 Treatment Time ( [address] only): 56 Visit #: 8 Treatment Area: Bilateral knee pain [M25.561, M25.562] SUBJECTIVE Pain Level (0-10 scale): 0/10 Any medication changes, allergies to medications, adverse drug reactions, diagnosis change, or new procedure performed?: [x] No    [] Yes (see summary sheet for update) Subjective functional status/changes:   [] No changes reported Patient reports she had been feeling better but has experienced \"grabbing\" along her L knee. She was on her feet all day the past few days and believes it could be due to this. OBJECTIVE 56 min Therapeutic Exercise:  [x] See flow sheet :  
Rationale: increase ROM and increase strength to improve the patients ability to perform ADLs and reduce pain levels With 
 [x] TE 
 [] TA 
 [] neuro 
 [] other: Patient Education: [x] Review HEP [] Progressed/Changed HEP based on:  
[] positioning   [] body mechanics   [] transfers   [] heat/ice application   
[] other:   
 
Other Objective/Functional Measures: none noted Pain Level (0-10 scale) post treatment: 0/10 ASSESSMENT/Changes in Function:  
 Patient continues to need a tactile cue in order to perform prone hip extension but is able to perform it. Tolerated all progressed therex well and will continue to progress as tolerated. Will plan on making next visit D/C day.  
Patient will continue to benefit from skilled PT services to modify and progress therapeutic interventions, address functional mobility deficits, address ROM deficits, address strength deficits, analyze and address soft tissue restrictions, analyze and cue movement patterns and analyze and modify body mechanics/ergonomics to attain remaining goals. [x]  See Plan of Care 
[]  See progress note/recertification 
[]  See Discharge Summary Progress towards goals / Updated goals: 
Patient is progressing towards goals, will continue to strengthen hip stabilizers in order to reduce pain levels. PLAN [x]  Upgrade activities as tolerated     [x]  Continue plan of care [x]  Update interventions per flow sheet      
[]  Discharge due to:_ 
[]  Other:_ Jeffory Homans Isom 9/4/2018  2:40 PM

## 2018-09-07 ENCOUNTER — HOSPITAL ENCOUNTER (OUTPATIENT)
Dept: PHYSICAL THERAPY | Age: 78
Discharge: HOME | End: 2018-09-07
Payer: MEDICARE

## 2018-09-07 PROCEDURE — 97110 THERAPEUTIC EXERCISES: CPT

## 2018-09-07 PROCEDURE — G8979 MOBILITY GOAL STATUS: HCPCS

## 2018-09-07 PROCEDURE — G8980 MOBILITY D/C STATUS: HCPCS

## 2018-09-07 NOTE — PROGRESS NOTES
PT DAILY TREATMENT NOTE - [facility] 2-15 Patient Name: Flori Agent Date:2018 : 1940 [x]  Patient  Verified Payor: [place] / Plan: Sherif Rodriguez / Product Type: Medicare / In time: 9:11 AM  Out time: 10:05 AM 
Total Treatment Time (min): 54 minutes Total Timed Codes (min): 54 minutes 1:1 Treatment Time ([facility] only): 25 minutes Visit #: 2 Treatment Area: Bilateral knee pain [M25.561, M25.562] SUBJECTIVE Pain Level (0-10 scale): 0/10 Any medication changes, allergies to medications, adverse drug reactions, diagnosis change, or new procedure performed?: [x] No    [] Yes (see summary sheet for update) Subjective functional status/changes:   [] No changes reported The Pt reports that her knees are feeling much better. She states that she does have mild discomfort in the L knee, but it is occasional and very mild. Overall, she believes that she is 70% improved since beginning therapy. OBJECTIVE 54 min Therapeutic Exercise:  [x] See flow sheet :  
Rationale: increase ROM, increase strength, improve coordination, improve balance and increase proprioception to improve the patients ability to ambulate and perform ADLs with less pain or discomfort. With 
 [x] TE 
 [] TA 
 [] neuro [x] other: Patient Education: [x] Review HEP [] Progressed/Changed HEP based on:  
[] positioning   [] body mechanics   [] transfers   [] heat/ice application   
[x] other: Pt educated how to safely progress her HEP independently Other Objective/Functional Measures:  FOTO 69/100 (52/100 at initial evaluation) Right knee AROM: 0-108 Left knee AROM: 0-110 Pain Level (0-10 scale) post treatment: 0/10 ASSESSMENT/Changes in Function:  
  
[]  See Plan of Care 
[]  See progress note/recertification 
[x]  See Discharge Summary Progress towards goals / Updated goals: 
Short Term Goals: To be accomplished in 6 treatments: 1. The Pt will be independent and compliant with their HEP- met 2. The Pt will report a 50% reduction in their pain with ADLs- met Long Term Goals: To be accomplished in 12 treatments: 
1. The Pt will score the MCII on her FOTO survey demonstrating improved overall function (52 to 58 points)- met FOTO 69/100 at discharge 2. The Pt will be able to stand after sitting for >/= 10 minutes without reports of stiffness to allow the Pt to be able to perform transfers without discomfort- met 3. The Pt will be able to safely navigate 1 standard flight of stairs with 0-2/10 pain to allow the Pt to be able to navigate stairs with less pain or discomfort- met 
  
PLAN 
[]  Upgrade activities as tolerated     []  Continue plan of care 
[]  Update interventions per flow sheet [x]  Discharge due to: Pt has met or is progressing well towards her therapeutic goals 
[]  Other:_ Kwadwo Herrera, PT 9/7/2018  9:26 AM

## 2018-09-07 NOTE — ANCILLARY DISCHARGE INSTRUCTIONS
[facility] [address] ([place]), [address] [facility] [place] Phone: [phone] Fax: [phone] Discharge Summary 2-15 Patient name: Larry Marques  : 1940  Provider#: [id number] Referral source: Zuleyka Dugan MD     
Medical/Treatment Diagnosis: Bilateral knee pain [M25.561, M25.562] Prior Hospitalization: see medical history Comorbidities: See Plan of Care Prior Level of Function: See Plan of Care Medications: Verified on Patient Summary List 
 
Start of Care: 18      Onset Date:2018 Visits from Start of Care: 8     Missed Visits: No show 1 Reporting Period : 18 to 18 Assessment/Summary of care: The Pt was seen for 8 outpatient physical therapy session with bilateral knee pain secondary to osteoarthritis. The Pt progressed well with her therapy program that focused on stretching her hip and gastroc/soleus complex musculature, improving her hip strength and stability, improving her balance and neuromuscular control, and improving her activity tolerance and endurance via therapeutic exercises. The Pt's LE strength has progressed nicely and she reports improved functional mobility with less pain or discomfort. She denies any functional limitations and overall believes that she is 70% improved since beginning therapy. At this time, it is believed that the Pt has reached maximum benefit from skilled PT and will continue to progress well independently. The Pt was educated how to safely progress her HEP independently and voiced understanding. The Pt is discharged from skilled PT and will contact her referring provider with any further questions or concerns. Thank you for this referral. 
 
Other Objective/Functional Measures:  FOTO 69/100 (52/100 at initial evaluation) Right knee AROM: 0-108 Left knee AROM: 0-110 Progress towards goals / Updated goals: 
Short Term Goals: To be accomplished in 6 treatments: 1. The Pt will be independent and compliant with their HEP- met 2. The Pt will report a 50% reduction in their pain with ADLs- met Long Term Goals: To be accomplished in 12 treatments: 
1. The Pt will score the MCII on her FOTO survey demonstrating improved overall function (52 to 58 points)- met FOTO 69/100 at discharge 2. The Pt will be able to stand after sitting for >/= 10 minutes without reports of stiffness to allow the Pt to be able to perform transfers without discomfort- met 3. The Pt will be able to safely navigate 1 standard flight of stairs with 0-2/10 pain to allow the Pt to be able to navigate stairs with less pain or discomfort- met 
    
G-Codes (GP) Mobility  Goal  CJ= 20-39%  D/C  CJ= 20-39% The severity rating is based on clinical judgment and the FOTO Score score. RECOMMENDATIONS: 
[x]Discontinue therapy: [x]Patient has reached or is progressing toward set goals []Patient is non-compliant or has abdicated 
   []Due to lack of appreciable progress towards set goals []Other Anamaria Terrell PT 9/7/2018 1:36 PM

## 2018-09-11 ENCOUNTER — HOSPITAL ENCOUNTER (OUTPATIENT)
Dept: PHYSICAL THERAPY | Age: 78
Discharge: HOME | End: 2018-09-11
Payer: MEDICARE

## 2018-09-11 PROCEDURE — G8985 CARRY GOAL STATUS: HCPCS

## 2018-09-11 PROCEDURE — 97162 PT EVAL MOD COMPLEX 30 MIN: CPT

## 2018-09-11 PROCEDURE — 97110 THERAPEUTIC EXERCISES: CPT

## 2018-09-11 PROCEDURE — G8984 CARRY CURRENT STATUS: HCPCS

## 2018-09-11 NOTE — PROGRESS NOTES
[facility] [address] ([place] [place] Phone: [phone] Fax: [phone] Plan of Care/Statement of Necessity for Physical Therapy Services  2-15 Patient name: Darian Botello  : 1940  Provider#: [id number] Referral source: Gisella Hammonds Medical/Treatment Diagnosis: Pain in right arm [M79.601] Prior Hospitalization: see medical history Comorbidities: Arthritis, asthma, BMI over 30, diabetes type I or II, HTN Prior Level of Function: The patient completed 20 minutes of exercise seldom or never Medications: Verified on Patient Summary List 
Start of Care: 18      Onset Date: 2018 The Plan of Care and following information is based on the information from the initial evaluation. Assessment/ key information: The Pt is a pleasant 77year old female who presents to therapy with bilateral shoulder pain (L>R) secondary to poor postural strength and stability. Based on examination, the Pt displays poor scapular strength and stability, poor postural awareness, decreased rotator cuff strength and stability, decreased posterior capsule mobility, and decreased activity tolerance and endurance. The patient would benefit from skilled physical therapy to help improve the above listed impairments to allow the patient to safely return to their prior level of function with less overall pain or risk of further injury. The patient has a fair prognosis with skilled physical therapy. Evaluation Complexity History MEDIUM  Complexity : 1-2 comorbidities / personal factors will impact the outcome/ POC ; Examination MEDIUM Complexity : 3 Standardized tests and measures addressing body structure, function, activity limitation and / or participation in recreation  ;Presentation MEDIUM Complexity : Evolving with changing characteristics  ; Clinical Decision Making MEDIUM Complexity : FOTO score of 26-74 Overall Complexity Rating: MEDIUM Problem List: pain affecting function, decrease ROM, decrease strength, decrease ADL/ functional abilitiies, decrease activity tolerance and decrease flexibility/ joint mobility Treatment Plan may include any combination of the following: Therapeutic exercise, Therapeutic activities, Neuromuscular re-education, Physical agent/modality, Manual therapy, Patient education, Self Care training, Functional mobility training and Home safety training Patient / Family readiness to learn indicated by: asking questions and interest 
Persons(s) to be included in education: patient (P) Barriers to Learning/Limitations: None Patient Goal (s): reduce pain Patient Self Reported Health Status: excellent Rehabilitation Potential: fair Short Term Goals: To be accomplished in 6 treatments: 
1. The Pt will be independent and compliant with their HEP. 2. The Pt will report a 50% reduction in their pain with ADLs. Long Term Goals: To be accomplished in 12 treatments: 
1. The Pt will score the MCII on her FOTO survey demonstrating improved overall function (49 to 64 points). 2. The Pt will have >/= 165 degrees of L shoulder forward elevation with 0-2/10 pain to allow Pt to be able to perform overhead activities with less pain or discomfort. 3. The Pt will be able to reach to T12 with her L arm with 0-2/10 pain to allow the Pt to be able to hook her bra with less pain or discomfort. Frequency / Duration: Patient to be seen 1-2 times per week for 12 treatments. Patient/ Caregiver education and instruction: self care, activity modification and exercises 
 
[x]  Plan of care has been reviewed with PTA 
 
G-Codes (GP) Carry  Current  CK= 40-59%  Goal  CJ= 20-39% The severity rating is based on clinical judgment and the FOTO Score score. Certification Period: 9/11/18-12/11/18 Dee Gandhi, PT 9/11/2018 2:49 PM 
 
 ________________________________________________________________________ I certify that the above Therapy Services are being furnished while the patient is under my care. I agree with the treatment plan and certify that this therapy is necessary. [address] [facility] Signature:____________________  Date:____________Time: _________

## 2018-09-11 NOTE — PROGRESS NOTES
PT INITIAL EVALUATION NOTE - [facility] 2-15 Patient Name: Etha Babinski Date:2018 : 1940 [x]  Patient  Verified Payor: [place] MED[place]ARE / Plan: Sherif Rodriguez / Product Type: Medicare / In time: 12:45 PM  Out time: 1:35 PM 
Total Treatment Time (min): 50 minutes Total Timed Codes (min): 15 minutes 1:1 Treatment Time ( only): 50 minutes Visit #: 1 Treatment Area: Pain in right arm [M79.601] SUBJECTIVE Pain Level (0-10 scale): 2.5/10 Any medication changes, allergies to medications, adverse drug reactions, diagnosis change, or new procedure performed?: [] No    [x] Yes (see summary sheet for update) Subjective: The Pt reports a soreness in her shoulders bilaterally (L>R) that began insidiously. She denies any radicular pain, numbness, tingling, or weakness in her UEs. Her soreness is along the lateral side of the arm and mildly along the top of the neck. Aggravating factors include overhead activities, reaching across her body, and behind her back. Relieving factors include: heat, ice, and rest. She is independent with all ADLs, but has more discomfort. She is R handed. She is sleeping well at night; she sleeps on her back. She is currently retired. PMH: bilateral carpal tunnel (R>L), HTN (controlled), diabetes (controlled). She is taking Aleve PRN for her pain. OBJECTIVE/EXAMINATION Posture: Forward head and protracted shoulders bilaterally Other Observations:  N/A Neurological: Sensations: Intact to light touch sensation C5-T2 bilaterally Cervical AROM:     
 Flexion: 45 Extension: 33 Side Bending: Right: 25 Left:25 Rotation: Right: 50  Left: 47 Shoulder AROM:  Right  Left Flexion:  [facility] Abduction:  [facility] 
 ER:   [facility] IR:   [facility]  WFL Shoulder PROM:  Right  Left Flexion:  [facility] Abduction:  [facility] 
 ER:   [facility] IR:   [facility]  WFL UPPER QUARTER   MUSCLE STRENGTH 
LEROY       R  L 
 0 - No Contraction  Shoulder Flexion 4  4- 
1 - Trace   Shoulder Abduction 4  4- 
2 - Poor   Shoulder ER  4-  4- 
3 - Fair    Shoulder IR  4  4 
4 - Good   Elbow Flexion  4  4 
5 - Normal   Elbow Extension 4  4 Wrist Flexion  4  4 Wrist Extension 4  4 Finger ABD/ADD 5  5 MiddleTrapezius NT  NT Lower Trapezius NT  NT 
 
Palpation: Pt denied any TTP around shoulders bilaterally Joint Assessment: Decreased posterior capsule mobility bilaterally (L>R) Special Tests:Cervical Compression: NT   Mckenzie's Sign: NT Spurling Test: NT    IR Lift Off: NT Gonzales-Juan: (+) L    ER Lag Sign: NT 
  Empty Can: NT    Speed's Test: NT Others: NT     Clonus: NT 
 
 
 
15 min Therapeutic Exercise:  [x] See flow sheet :  
Rationale: increase ROM and increase strength to improve the patients ability to perform ADLs with less pain or discomfort. With 
 [x] TE 
 [] TA 
 [] neuro [x] other: Patient Education: [x] Review HEP [] Progressed/Changed HEP based on:  
[] positioning   [] body mechanics   [] transfers   [] heat/ice application   
[x] other: Pt educated on diagnosis and prognosis with therapy Other Objective/Functional Measures: FOTO 49/100 Pain Level (0-10 scale) post treatment: 0/10 ASSESSMENT/Changes in Function:  
 
[x]  See Plan of Care Kwadwo Herrera PT 9/11/2018  12:48 PM

## 2018-09-17 ENCOUNTER — APPOINTMENT (OUTPATIENT)
Dept: PHYSICAL THERAPY | Age: 78
End: 2018-09-17
Payer: MEDICARE

## 2018-10-03 ENCOUNTER — HOSPITAL ENCOUNTER (OUTPATIENT)
Dept: PHYSICAL THERAPY | Age: 78
Discharge: HOME | End: 2018-10-03
Payer: MEDICARE

## 2018-10-03 PROCEDURE — 97110 THERAPEUTIC EXERCISES: CPT

## 2018-10-03 NOTE — PROGRESS NOTES
PT DAILY TREATMENT NOTE - [facility] 2-15 Patient Name: Bryn Munoz Date:10/3/2018 : 1940 [x]  Patient  Verified Payor: [place] MEDICARE / Plan: Sherif Armstrong / Product Type: Medicare / In time:1030  Out time:1121 Total Treatment Time (min): 51 Total Timed Codes (min): 51 
1:1 Treatment Time ( [address] only): 29 Visit #: 2 Treatment Area: Pain in right arm [M79.601] SUBJECTIVE Pain Level (0-10 scale): 0/10 Any medication changes, allergies to medications, adverse drug reactions, diagnosis change, or new procedure performed?: [x] No    [] Yes (see summary sheet for update) Subjective functional status/changes:   [] No changes reported Patient reports she was walking up the stairs where her shoe got stuck on one step and she fell hitting her L knee. She was able to get back up by herself and only experienced soreness in the knee after. Does believe the exercises have been helping her shoulder. OBJECTIVE 51 min Therapeutic Exercise:  [x] See flow sheet :  
Rationale: increase ROM and increase strength to improve the patients ability to perform ADLs and reduce pain levels With 
 [x] TE 
 [] TA 
 [] neuro 
 [] other: Patient Education: [x] Review HEP [] Progressed/Changed HEP based on:  
[] positioning   [] body mechanics   [] transfers   [] heat/ice application   
[] other:   
 
Other Objective/Functional Measures: none noted Pain Level (0-10 scale) post treatment: 0/10 ASSESSMENT/Changes in Function:  
Will require VCs in order to properly perform therex. Demonstrates poor scapular retraction ability. Will show signs of upper trap compensation while performing rows and shoulder extension. Will continue to progress therex as tolerated.  
Patient will continue to benefit from skilled PT services to modify and progress therapeutic interventions, address functional mobility deficits, address ROM deficits, address strength deficits, analyze and address soft tissue restrictions, analyze and cue movement patterns, analyze and modify body mechanics/ergonomics and assess and modify postural abnormalities to attain remaining goals. [x]  See Plan of Care 
[]  See progress note/recertification 
[]  See Discharge Summary Progress towards goals / Updated goals: 
Patient is progressing towards goals, will continue to strengthen the shoulder stabilizers in order to reduce pain levels. PLAN [x]  Upgrade activities as tolerated     [x]  Continue plan of care [x]  Update interventions per flow sheet      
[]  Discharge due to:_ 
[]  Other:_ Geovani Clarke 10/3/2018  10:52 AM

## 2018-10-09 ENCOUNTER — HOSPITAL ENCOUNTER (OUTPATIENT)
Dept: PHYSICAL THERAPY | Age: 78
Discharge: HOME | End: 2018-10-09
Payer: MEDICARE

## 2018-10-09 PROCEDURE — 97110 THERAPEUTIC EXERCISES: CPT

## 2018-10-09 NOTE — PROGRESS NOTES
PT DAILY TREATMENT NOTE - [facility] 2-15 Patient Name: Cassandra Morrell Date:10/9/2018 : 1940 [x]  Patient  Verified Payor: [place] MEDICARE / Plan: Sehrif Burroughs y / Product Type: Medicare / In time:400  Out time:447 Total Treatment Time (min): 47 Total Timed Codes (min): 47 
1:1 Treatment Time ([facility] only): 39 Visit #: 35 Treatment Area: Pain in right arm [M79.601] SUBJECTIVE Pain Level (0-10 scale): 0/10 Any medication changes, allergies to medications, adverse drug reactions, diagnosis change, or new procedure performed?: [x] No    [] Yes (see summary sheet for update) Subjective functional status/changes:   [] No changes reported Patient reports her shoulder has been feeling better except when she lays on her RUE. She went to the ED  because her BP was 202. OBJECTIVE 47 min Therapeutic Exercise:  [] See flow sheet :  
Rationale: increase ROM and increase strength to improve the patients ability to perform ADLs and reduce pain levels With 
 [] TE 
 [] TA 
 [] neuro 
 [] other: Patient Education: [x] Review HEP [] Progressed/Changed HEP based on:  
[] positioning   [] body mechanics   [] transfers   [] heat/ice application   
[] other:   
 
Other Objective/Functional Measures: none noted Pain Level (0-10 scale) post treatment: 0/10 ASSESSMENT/Changes in Function:  
Limited cervical side bend one the L. Poor scapular retraction ability. Upper trap compensation demonstrated while performing therex. Will continue to progress as tolerated. Patient will continue to benefit from skilled PT services to modify and progress therapeutic interventions, address functional mobility deficits, address ROM deficits, address strength deficits, analyze and address soft tissue restrictions, analyze and cue movement patterns and analyze and modify body mechanics/ergonomics to attain remaining goals. [x]  See Plan of Care 
[]  See progress note/recertification []  See Discharge Summary Progress towards goals / Updated goals: 
Patient is progressing towards goals, will continue to strengthen scapular stabilizers in order to reduce pain levels. PLAN [x]  Upgrade activities as tolerated     [x]  Continue plan of care [x]  Update interventions per flow sheet      
[]  Discharge due to:_ 
[]  Other:_ Kaila Clarke 10/9/2018  4:13 PM

## 2018-10-11 ENCOUNTER — HOSPITAL ENCOUNTER (OUTPATIENT)
Dept: PHYSICAL THERAPY | Age: 78
Discharge: HOME | End: 2018-10-11
Payer: MEDICARE

## 2018-10-11 PROCEDURE — G8985 CARRY GOAL STATUS: HCPCS

## 2018-10-11 PROCEDURE — 97110 THERAPEUTIC EXERCISES: CPT

## 2018-10-11 PROCEDURE — G8984 CARRY CURRENT STATUS: HCPCS

## 2018-10-11 NOTE — PROGRESS NOTES
[facility] [address] ([address]), [address] Nicole Blancas Phone: [phone] Fax: [phone] Progress Note Name: Melodie Regalado : 1940 MD: Eugenio Host * Treatment Diagnosis: Pain in right arm [M79.601] Start of Care: 18 Visits from Start of Care: 4 Missed Visits: COBY 1 Summary of Care: The Pt has been seen for 4 outpatient physical therapy sessions with bilateral shoulder pain (L>R) secondary to poor postural strength and stability. The Pt's therapy program has focused on improving her scapular strength and stability, improving her core and postural strength and stability, improving her postural awareness, improving her rotator cuff strength and stability, and improving her activity tolerance and endurance via therapeutic exercises. The Pt is reporting improved pain with all ADLs (both overhead and below shoulder height). She is having less discomfort and able to do more activities before experiencing pain and discomfort. Overall, she believes that she is 50% improved since beginning therapy. The Pt continues to have poor postural awareness and requires significant cuing to perform her ADLs with proper form and technique. Recommend continued PT for an additional 6 sessions to help progress her HEP and improve her postural strength and control to allow the Pt to fully return to her PLOF safely and with less risk of future injury. Thank you for this referral. 
 
Other Objective/Functional Measures:   
Foto: 56/100 (49/100 at initial evaluation) Cervical AROM:                                                                                      
                      Flexion: 45                 
                      Extension: 41                                   
                      Side Bending: Right: 02                    OYJM:85                                  
                      Rotation: Right: 60                                      Left: 65                   
  
Shoulder AROM:                                       Right                                   Left 
                      DTSYRMU:                                           591                                     888 
                      Abduction:                                       155                                     135 
                      VN:                                                  D2                                       T2 
                      IR:                                                    T11                                     T11 Progress towards goals / Updated goals: 
Short Term Goals: To be accomplished in 6 treatments: 
1. The Pt will be independent and compliant with their HEP. Met  
2. The Pt will report a 50% reduction in their pain with ADLs. met Long Term Goals: To be accomplished in 12 treatments: 
1. The Pt will score the MCII on her FOTO survey demonstrating improved overall function (49 to 64 points). Not met 2. The Pt will have >/= 165 degrees of L shoulder forward elevation with 0-2/10 pain to allow Pt to be able to perform overhead activities with less pain or discomfort. Not met 3. The Pt will be able to reach to T12 with her L arm with 0-2/10 pain to allow the Pt to be able to hook her bra with less pain or discomfort. met G-Codes (GP) Carry  Current  CK= 40-59%  Goal  CJ= 20-39% The severity rating is based on the FOTO Score score. Rey Puckett, PT 10/11/2018 3:28 PM 
 
________________________________________________________________________ NOTE TO PHYSICIAN:  Please complete the following and fax to: [facility]: Fax: [phone] Alan Carpenter Retain this original for your records. If you are unable to process this request in 24 hours, please contact our office. ____ I have read the above report and request that my patient continue therapy with the following changes/special instructions: 
____ I have read the above report and request that my patient be discharged from therapy [de-identified] Signature:_________________ Date:___________Time:__________

## 2018-10-11 NOTE — PROGRESS NOTES
PT DAILY TREATMENT NOTE - [facility] 2-15 Patient Name: Jorge García Date:10/11/2018 : 1940 [x]  Patient  Verified Payor: [place] MEDICARE / Plan: Sherif Burroughs y / Product Type: Medicare / In time:1134  Out time:1235 Total Treatment Time (min): 61 Total Timed Codes (min): 61 
1:1 Treatment Time ( [address] only): 28 Visit #: 4 Treatment Area: Pain in right arm [M79.606] SUBJECTIVE Pain Level (0-10 scale): 0/10 Any medication changes, allergies to medications, adverse drug reactions, diagnosis change, or new procedure performed?: [x] No    [] Yes (see summary sheet for update) Subjective functional status/changes:   [] No changes reported Patient reports she experienced soreness in both shoulders last night but hasn't experienced pain since. Patient believes she is 50% better OBJECTIVE 61 min Therapeutic Exercise:  [x] See flow sheet :  
Rationale: increase ROM and increase strength to improve the patients ability to perform ADLs and reduce pain levels With 
 [x] TE 
 [] TA 
 [] neuro 
 [] other: Patient Education: [x] Review HEP [] Progressed/Changed HEP based on:  
[] positioning   [] body mechanics   [] transfers   [] heat/ice application   
[] other:   
 
Other Objective/Functional Measures:   
Foto: 56 Cervical AROM:                                                                                      
                      Flexion: 45 Extension: 36 Side Bending: Right: 27                    Left:30 Rotation: Right: 60                                      Left: 65 Shoulder AROM:                                       Right                                   Left Flexion:                                           160                                     155 Abduction:                                       155                                     135 ER:                                                  T2                                       T2 
                      IR:                                                    T11                                     T11 
  
 
Pain Level (0-10 scale) post treatment: 0/10 ASSESSMENT/Changes in Function:  
Patient demonstrates poor scapular retraction ability. Will show upper trap compensation with all shoulder AROM. Continues to show severe rounded shoulders and forward head posture. Will continue to progress therex as tolerated. Patient will continue to benefit from skilled PT services to modify and progress therapeutic interventions, address functional mobility deficits, address ROM deficits, address strength deficits, analyze and address soft tissue restrictions, analyze and cue movement patterns, analyze and modify body mechanics/ergonomics and assess and modify postural abnormalities to attain remaining goals. []  See Plan of Care [x]  See progress note/recertification 
[]  See Discharge Summary Progress towards goals / Updated goals: 
Short Term Goals: To be accomplished in 6 treatments: 
1. The Pt will be independent and compliant with their HEP. Met  
2. The Pt will report a 50% reduction in their pain with ADLs. met Long Term Goals: To be accomplished in 12 treatments: 
1. The Pt will score the MCII on her FOTO survey demonstrating improved overall function (49 to 64 points). Not met 2. The Pt will have >/= 165 degrees of L shoulder forward elevation with 0-2/10 pain to allow Pt to be able to perform overhead activities with less pain or discomfort. Not met 3. The Pt will be able to reach to T12 with her L arm with 0-2/10 pain to allow the Pt to be able to hook her bra with less pain or discomfort. met PLAN 
 [x]  Upgrade activities as tolerated     [x]  Continue plan of care [x]  Update interventions per flow sheet      
[]  Discharge due to:_ 
[]  Other:_ Rose Clarke 10/11/2018  11:56 AM

## 2018-10-17 ENCOUNTER — HOSPITAL ENCOUNTER (OUTPATIENT)
Dept: PHYSICAL THERAPY | Age: 78
Discharge: HOME | End: 2018-10-17
Payer: MEDICARE

## 2018-10-17 PROCEDURE — 97110 THERAPEUTIC EXERCISES: CPT

## 2018-10-17 NOTE — PROGRESS NOTES
PT DAILY TREATMENT NOTE - [facility] 2-15 Patient Name: Allyson Smith Date:10/17/2018 : 1940 [x]  Patient  Verified Payor: [place] MEDICARE / Plan: Sherif Burroughs y / Product Type: Medicare / In time: 1044  Out time:1124 Total Treatment Time (min): 40 Total Timed Codes (min): 40 
1:1 Treatment Time ( only): 15 Visit #: 7 Treatment Area: Pain in right arm [M79.601] The Student Physical Therapist participated in the delivery of services under the direction of a licensed PT directing the service, making the skilled judgment, and who is responsible for the assessment and treatment. SUBJECTIVE Pain Level (0-10 scale): 0 Any medication changes, allergies to medications, adverse drug reactions, diagnosis change, or new procedure performed?: [x] No    [] Yes (see summary sheet for update) Subjective functional status/changes:   [] No changes reported Patient reports that her blood pressure over the weekend was in the 200s. She is seeing the doctor today. She reports that her shoulders are doing well and not bothering her as frequently OBJECTIVE 40 min Therapeutic Exercise:  [x] See flow sheet :  
Rationale: increase ROM and increase strength to improve the patients ability to self-correct her posture and increase function at home and in the community. With 
 [x] TE 
 [] TA 
 [] neuro 
 [] other: Patient Education: [x] Review HEP [x] Progressed/Changed HEP based on:  
[] positioning   [] body mechanics   [] transfers   [] heat/ice application   
[] other:   
 
Other Objective/Functional Measures:  
 Blood pressure (after UBE): 175/80, 163/80 Pain Level (0-10 scale) post treatment: 1-2/10 (Patient stated that it was more of an annoyance) ASSESSMENT/Changes in Function:  
Patient was late today due to traffic and she has an appointment after therapy due to her blood pressure issues over the weekend.  She didn't present with any issues during therapy and she was able to perform all exercises well. She requires cuing to complete exercises properly with good form. Patient will continue to benefit from skilled PT services to modify and progress therapeutic interventions, address functional mobility deficits, address ROM deficits, address strength deficits, analyze and cue movement patterns and assess and modify postural abnormalities to attain remaining goals. [x]  See Plan of Care 
[]  See progress note/recertification 
[]  See Discharge Summary Progress towards goals / Updated goals: 
Patient is progressing towards goals. Will continue to address shoulder and scapular strength in order to improve posture and decrease pain. PLAN [x]  Upgrade activities as tolerated     [x]  Continue plan of care [x]  Update interventions per flow sheet      
[]  Discharge due to:_ 
[]  Other:_   
 
Vijay Mask 10/17/2018  11:41 AM

## 2018-10-19 ENCOUNTER — APPOINTMENT (OUTPATIENT)
Dept: PHYSICAL THERAPY | Age: 78
End: 2018-10-19
Payer: MEDICARE

## 2018-10-24 ENCOUNTER — APPOINTMENT (OUTPATIENT)
Dept: PHYSICAL THERAPY | Age: 78
End: 2018-10-24
Payer: MEDICARE

## 2018-10-26 ENCOUNTER — HOSPITAL ENCOUNTER (OUTPATIENT)
Dept: PHYSICAL THERAPY | Age: 78
Discharge: HOME | End: 2018-10-26
Payer: MEDICARE

## 2018-10-26 PROCEDURE — 97110 THERAPEUTIC EXERCISES: CPT

## 2018-10-26 NOTE — PROGRESS NOTES
PT DAILY TREATMENT NOTE - [facility] 2-15 Patient Name: Domonique Troy Date:10/26/2018 : 1940 [x]  Patient  Verified Payor: [place] MEDICARE / Plan: Sherif Armstrongy / Product Type: Medicare / In time:935  Out time:1020 Total Treatment Time (min): 45 Total Timed Codes (min): 45 
1:1 Treatment Time ( only): 32 Visit #: 6 Treatment Area: Pain in right arm [M79.601] SUBJECTIVE Pain Level (0-10 scale): 6/10 Any medication changes, allergies to medications, adverse drug reactions, diagnosis change, or new procedure performed?: [x] No    [] Yes (see summary sheet for update) Subjective functional status/changes:   [] No changes reported Patient reports her L shoulder and L knee have been acting up on her the past couple of days. OBJECTIVE 45 min Therapeutic Exercise:  [x] See flow sheet :  
Rationale: increase ROM and increase strength to improve the patients ability to perform ADLs and reduce pain levels With 
 [] TE 
 [] TA 
 [] neuro 
 [] other: Patient Education: [x] Review HEP [] Progressed/Changed HEP based on:  
[] positioning   [] body mechanics   [] transfers   [] heat/ice application   
[] other:   
 
Other Objective/Functional Measures: none noted Pain Level (0-10 scale) post treatment: 0/10 ASSESSMENT/Changes in Function:  
Continues to demonstrate difficulty with maintaining scapular contractions. Will continue to progress therex as tolerated. Patient will continue to benefit from skilled PT services to modify and progress therapeutic interventions, address functional mobility deficits, address ROM deficits, address strength deficits, analyze and address soft tissue restrictions, analyze and cue movement patterns and analyze and modify body mechanics/ergonomics to attain remaining goals. []  See Plan of Care 
[]  See progress note/recertification 
[]  See Discharge Summary Progress towards goals / Updated goals: Patient is progressing towards goals, will continue to improve muscular extensibility in order to reduce pain levels PLAN 
[]  Upgrade activities as tolerated     []  Continue plan of care 
[]  Update interventions per flow sheet      
[]  Discharge due to:_ 
[]  Other:_ Hussain Clarke 10/26/2018  9:50 AM

## 2018-10-31 ENCOUNTER — HOSPITAL ENCOUNTER (OUTPATIENT)
Dept: PHYSICAL THERAPY | Age: 78
Discharge: HOME | End: 2018-10-31
Payer: MEDICARE

## 2018-10-31 PROCEDURE — 97110 THERAPEUTIC EXERCISES: CPT

## 2018-10-31 NOTE — PROGRESS NOTES
PT DAILY TREATMENT NOTE - [facility] 2-15 Patient Name: Daina Pallas Date:10/31/2018 : 1940 [x]  Patient  Verified Payor: [place] MEDICARE / Plan: Sherif Burroughs y / Product Type: Medicare / In time:1040  Out time:1117 Total Treatment Time (min): 37 Total Timed Codes (min): 37 
1:1 Treatment Time ( [address] only): 25 Visit #: 4 Treatment Area: Pain in right arm [M79.601] The Student Physical Therapist participated in the delivery of services under the direction of a licensed PT directing the service, making the skilled judgment, and who is responsible for the assessment and treatment. SUBJECTIVE Pain Level (0-10 scale): 0/10 Any medication changes, allergies to medications, adverse drug reactions, diagnosis change, or new procedure performed?: [x] No    [] Yes (see summary sheet for update) Subjective functional status/changes:   [] No changes reported Patient reports that her shoulder has been \"off and on\" since the last visit. Yesterday, she was in a lot of pain but it has decreased. A hot shower and aleve helped. She reports that her blood pressure has been better. OBJECTIVE 37 min Therapeutic Exercise:  [x] See flow sheet :  
Rationale: increase ROM and increase strength to improve the patients ability to perform all activities at home and in the community With 
 [x] TE 
 [] TA 
 [] neuro 
 [] other: Patient Education: [x] Review HEP [x] Progressed/Changed HEP based on:  
[] positioning   [] body mechanics   [] transfers   [] heat/ice application   
[] other:   
 
Other Objective/Functional Measures: n/t Pain Level (0-10 scale) post treatment: 1/10 ASSESSMENT/Changes in Function:  
Patient tolerated exercises well today including the newly added activities to address the strength of her bilateral shoulders. Continues to require cuing to retract scapula during exercises in order to prevent compensations.  Likely will discharge on the next visit due to plateau in progress. Patient will continue to benefit from skilled PT services to modify and progress therapeutic interventions, address functional mobility deficits, address ROM deficits, address strength deficits, analyze and cue movement patterns and assess and modify postural abnormalities to attain remaining goals. [x]  See Plan of Care 
[]  See progress note/recertification 
[]  See Discharge Summary Progress towards goals / Updated goals: 
Patient is progressing towards goals. Will continue to address scapular/shoulder strength in order to improve posture. PLAN [x]  Upgrade activities as tolerated     [x]  Continue plan of care [x]  Update interventions per flow sheet      
[]  Discharge due to:_ 
[]  Other:_   
 
Rosalene Rides 10/31/2018  10:49 AM

## 2018-11-02 ENCOUNTER — HOSPITAL ENCOUNTER (OUTPATIENT)
Dept: PHYSICAL THERAPY | Age: 78
Discharge: HOME | End: 2018-11-02
Payer: MEDICARE

## 2018-11-02 PROCEDURE — 97110 THERAPEUTIC EXERCISES: CPT

## 2018-11-02 NOTE — PROGRESS NOTES
PT DAILY TREATMENT NOTE - [facility] 2-15 Patient Name: Rosa Lockwood Date:2018 : 1940 [x]  Patient  Verified Payor: [place] / Plan: Sherif Rodriguez / Product Type: Medicare / In time: 10:27 AM  Out time: 11:13 PM 
Total Treatment Time (min): 46 minutes Total Timed Codes (min): 46 minutes 1:1 Treatment Time (MC only): 40 minutes Visit #: 5 Treatment Area: Pain in right arm [M79.601] SUBJECTIVE Pain Level (0-10 scale): 3/10 Any medication changes, allergies to medications, adverse drug reactions, diagnosis change, or new procedure performed?: [x] No    [] Yes (see summary sheet for update) Subjective functional status/changes:   [] No changes reported The Pt reports that her shoulders are doing well. She has been trying to do the new exercises at home, but is unsure if she is doing them appropriately. OBJECTIVE 46 min Therapeutic Exercise:  [x] See flow sheet :  
Rationale: increase ROM and increase strength to improve the patients ability to perform ADLs With 
 [x] TE 
 [] TA 
 [] neuro 
 [] other: Patient Education: [x] Review HEP [] Progressed/Changed HEP based on:  
[] positioning   [] body mechanics   [] transfers   [] heat/ice application   
[] other:   
 
Other Objective/Functional Measures: None noted Pain Level (0-10 scale) post treatment: 0/10 ASSESSMENT/Changes in Function: The Pt tolerated the progressions to her therapy program well. She required significant verbal cuing to correct her form and technique, but was able to institute the corrections well. She continues to have a forward head and protracted shoulders with her resting posture and while doing most of her exercises (requires cues to correct).  
Patient will continue to benefit from skilled PT services to modify and progress therapeutic interventions, address functional mobility deficits, address ROM deficits, address strength deficits, analyze and address soft tissue restrictions, analyze and cue movement patterns, analyze and modify body mechanics/ergonomics, assess and modify postural abnormalities and instruct in home and community integration to attain remaining goals. []  See Plan of Care 
[]  See progress note/recertification 
[]  See Discharge Summary Progress towards goals / Updated goals: 
Short Term Goals: To be accomplished in 6 treatments: 
1. The Pt will be independent and compliant with their HEP. Met  
2. The Pt will report a 50% reduction in their pain with ADLs. met Long Term Goals: To be accomplished in 12 treatments: 
1. The Pt will score the MCII on her FOTO survey demonstrating improved overall function (49 to 64 points). Not met 2. The Pt will have >/= 165 degrees of L shoulder forward elevation with 0-2/10 pain to allow Pt to be able to perform overhead activities with less pain or discomfort. Not met 3. The Pt will be able to reach to T12 with her L arm with 0-2/10 pain to allow the Pt to be able to hook her bra with less pain or discomfort. met PLAN [x]  Upgrade activities as tolerated     [x]  Continue plan of care [x]  Update interventions per flow sheet      
[]  Discharge due to:_ 
[]  Other:_ Neelima Fernandez, PT 11/2/2018  10:52 AM

## 2018-11-13 ENCOUNTER — HOSPITAL ENCOUNTER (OUTPATIENT)
Dept: PHYSICAL THERAPY | Age: 78
Discharge: HOME | End: 2018-11-13
Payer: MEDICARE

## 2018-11-13 PROCEDURE — G8985 CARRY GOAL STATUS: HCPCS

## 2018-11-13 PROCEDURE — G8984 CARRY CURRENT STATUS: HCPCS

## 2018-11-13 PROCEDURE — 97110 THERAPEUTIC EXERCISES: CPT

## 2018-11-13 NOTE — PROGRESS NOTES
[facility] [address] (MOB IV), [address] [place] Phone: [phone] Fax: [phone] Progress Note Name: Soni Carrasco : 1940 MD: Suzie Portilol * Treatment Diagnosis: Pain in arm [M79.603] Start of Care: 18 Visits from Start of Care: 9 Missed Visits: [id number] 3 Summary of Care: The Pt has been seen for 9 outpatient physical therapy sessions with bilateral shoulder pain (L>R) secondary to poor postural strength and stability. The Pt's therapy program has focused on improving her scapular strength and stability, improving her core and postural strength and stability, improving her postural awareness, improving her rotator cuff strength and stability, and improving her activity tolerance and endurance via therapeutic exercises. The Pt has progressed well, but slowly with her therapy program.  She is having less shoulder pain with her ADLs throughout the day, but in the evening her shoulder pain will increase and she reports a lot of achiness throughout the shoulders bilaterally. She reports that the most aggravating factors are repetitive activities (sweeping and mopping). Overall, she believes that she is 50% improved since beginning therapy. Recommend 3 additional PT sessions to help progress her HEP and to allow the Pt to safely return to her PLOF with less overall pain or risk of further injury. Thank you for this referral.  
 
Other Objective/Functional Measures:  FOTO 56/100 (49/100 at initial evaluation) R Shoulder AROM: 
Flexion: 170 Abduction: 150 ER: T2 
IR: T10 
  
L Shoulder AROM: 
Flexion: 160 Abduction: 146 ER: T2 
IR: T10 Progress towards goals / Updated goals: 
Short Term Goals: To be accomplished in 6 treatments: 
1. The Pt will be independent and compliant with their HEP- met 2. The Pt will report a 50% reduction in their pain with ADLs- met Long Term Goals: To be accomplished in 12 treatments: 
1. The Pt will score the MCII on her FOTO survey demonstrating improved overall function (49 to 64 points)- progression FOTO 56/100 
2. The Pt will have >/= 165 degrees of L shoulder forward elevation with 0-2/10 pain to allow Pt to be able to perform overhead activities with less pain or discomfort- progressing 3. The Pt will be able to reach to T12 with her L arm with 0-2/10 pain to allow the Pt to be able to hook her bra with less pain or discomfort- met G-Codes (GP) Carry  Current  CK= 40-59%  Goal  CJ= 20-39% The severity rating is based on the FOTO Score score. Brandi Rojas, PT 11/13/2018 3:36 PM 
 
________________________________________________________________________ NOTE TO PHYSICIAN:  Please complete the following and fax to: [facility]: Fax: [phone] Ayaan Still Retain this original for your records. If you are unable to process this request in 24 hours, please contact our office. ____ I have read the above report and request that my patient continue therapy with the following changes/special instructions: 
____ I have read the above report and request that my patient be discharged from therapy [de-identified] Signature:_________________ Date:___________Time:__________

## 2018-11-13 NOTE — PROGRESS NOTES
PT DAILY TREATMENT NOTE - [facility] 2-15 Patient Name: Domonique Troy Date:2018 : 1940 [x]  Patient  Verified Payor: [place] / Plan: Sherif Rodriguez / Product Type: Medicare / In time: 2:04 PM  Out time: 3:00 PM 
Total Treatment Time (min):  56 minutes Total Timed Codes (min): 56 minutes 1:1 Treatment Time ( only): 23 minutes Visit #: 6 Treatment Area: Pain in arm [Z22.550] SUBJECTIVE Pain Level (0-10 scale): 3.510 Any medication changes, allergies to medications, adverse drug reactions, diagnosis change, or new procedure performed?: [x] No    [] Yes (see summary sheet for update) Subjective functional status/changes:   [] No changes reported The Pt reports that she is doing well and has less pain during the days. She reports that her shoulders start to get achy at the end of the day, but she is having less pain throughout the day. She will take a hot shower and that helps to loosen them up. She reports the most discomfort with repetitive activities (sweeping and mopping). Overall, she believes that she is 50% improved since beginning therapy. OBJECTIVE 56 min Therapeutic Exercise:  [x] See flow sheet :  
Rationale: increase ROM and increase strength to improve the patients ability to perform ADls with less pain or discomfort. With 
 [x] TE 
 [] TA 
 [] neuro 
 [] other: Patient Education: [x] Review HEP [] Progressed/Changed HEP based on:  
[] positioning   [] body mechanics   [] transfers   [] heat/ice application   
[] other:   
 
Other Objective/Functional Measures:  FOTO 56/100 (49/100 at initial evaluation) R Shoulder AROM: 
Flexion: 170 Abduction: 150 ER: T2 
IR: T10 
 
L Shoulder AROM: 
Flexion: 160 Abduction: 146 ER: T2 
IR: T10 Pain Level (0-10 scale) post treatment: 1/10 ASSESSMENT/Changes in Function:  
 
Patient will continue to benefit from skilled PT services to modify and progress therapeutic interventions, address functional mobility deficits, address ROM deficits, address strength deficits, analyze and address soft tissue restrictions, analyze and cue movement patterns, analyze and modify body mechanics/ergonomics, assess and modify postural abnormalities and instruct in home and community integration to attain remaining goals. []  See Plan of Care [x]  See progress note/recertification 
[]  See Discharge Summary Progress towards goals / Updated goals: 
Short Term Goals: To be accomplished in 6 treatments: 
1. The Pt will be independent and compliant with their HEP- met 2. The Pt will report a 50% reduction in their pain with ADLs- met Long Term Goals: To be accomplished in 12 treatments: 
1. The Pt will score the MCII on her FOTO survey demonstrating improved overall function (49 to 64 points)- progression FOTO 56/100 
2. The Pt will have >/= 165 degrees of L shoulder forward elevation with 0-2/10 pain to allow Pt to be able to perform overhead activities with less pain or discomfort- progressing 3. The Pt will be able to reach to T12 with her L arm with 0-2/10 pain to allow the Pt to be able to hook her bra with less pain or discomfort- met PLAN [x]  Upgrade activities as tolerated     [x]  Continue plan of care [x]  Update interventions per flow sheet      
[]  Discharge due to:_ 
[]  Other:_ Rey Puckett, PT 11/13/2018  2:25 PM

## 2018-11-15 ENCOUNTER — HOSPITAL ENCOUNTER (OUTPATIENT)
Dept: PHYSICAL THERAPY | Age: 78
Discharge: HOME | End: 2018-11-15
Payer: MEDICARE

## 2018-11-15 PROCEDURE — 97110 THERAPEUTIC EXERCISES: CPT

## 2018-11-15 NOTE — PROGRESS NOTES
PT DAILY TREATMENT NOTE - [facility] 2-15 Patient Name: Kortney Arenas Date:11/15/2018 : 1940 [x]  Patient  Verified Payor: [place] MEDICARE / Plan: Sherif Rodriguez / Product Type: Medicare / In time: 2:30 PM  Out time: 3:28 PM 
Total Treatment Time (min): 58 minutes Total Timed Codes (min): 58 minutes 1:1 Treatment Time (MC only): 54 minutes Visit #: 37 Treatment Area: Pain in arm [G51.628] SUBJECTIVE Pain Level (0-10 scale): 0/10 Any medication changes, allergies to medications, adverse drug reactions, diagnosis change, or new procedure performed?: [x] No    [] Yes (see summary sheet for update) Subjective functional status/changes:   [] No changes reported The Pt denies any significant changes from her last session. She continues to report diligence with her HEP. OBJECTIVE 58 min Therapeutic Exercise:  [x] See flow sheet :  
Rationale: increase ROM and increase strength to improve the patients ability to perform ADLs with less pain or discomfort. With 
 [x] TE 
 [] TA 
 [] neuro 
 [] other: Patient Education: [x] Review HEP [] Progressed/Changed HEP based on:  
[] positioning   [] body mechanics   [] transfers   [] heat/ice application   
[] other:   
 
Other Objective/Functional Measures: None noted Pain Level (0-10 scale) post treatment: 0/10 ASSESSMENT/Changes in Function: The Pt tolerated the additions to her therapy program well. She is progressing well with her exercises and towards her therapeutic goals. Will continue to progress as tolerated during next PT session.  
Patient will continue to benefit from skilled PT services to modify and progress therapeutic interventions, address functional mobility deficits, address ROM deficits, address strength deficits, analyze and address soft tissue restrictions, analyze and cue movement patterns, analyze and modify body mechanics/ergonomics, assess and modify postural abnormalities and instruct in home and community integration to attain remaining goals. []  See Plan of Care 
[]  See progress note/recertification 
[]  See Discharge Summary Progress towards goals / Updated goals: 
Short Term Goals: To be accomplished in 6 treatments: 
1. The Pt will be independent and compliant with their HEP- met 2. The Pt will report a 50% reduction in their pain with ADLs- met Long Term Goals: To be accomplished in 12 treatments: 
1. The Pt will score the MCII on her FOTO survey demonstrating improved overall function (49 to 64 points)- progression FOTO 56/100 
2. The Pt will have >/= 165 degrees of L shoulder forward elevation with 0-2/10 pain to allow Pt to be able to perform overhead activities with less pain or discomfort- progressing 3. The Pt will be able to reach to T12 with her L arm with 0-2/10 pain to allow the Pt to be able to hook her bra with less pain or discomfort- met 
  
PLAN [x]  Upgrade activities as tolerated     [x]  Continue plan of care [x]  Update interventions per flow sheet      
[]  Discharge due to:_ 
[]  Other:_ Gloria Spence, PT 11/15/2018  2:49 PM

## 2018-11-20 ENCOUNTER — HOSPITAL ENCOUNTER (OUTPATIENT)
Dept: PHYSICAL THERAPY | Age: 78
Discharge: HOME | End: 2018-11-20
Payer: MEDICARE

## 2018-11-20 PROCEDURE — 97110 THERAPEUTIC EXERCISES: CPT

## 2018-11-20 NOTE — PROGRESS NOTES
PT DAILY TREATMENT NOTE - [facility] 2-15 Patient Name: Rosa Lockwood Date:2018 : 1940 [x]  Patient  Verified Payor: [place] MEDICARE / Plan: Sherif Rodriguez / Product Type: Medicare / In time: 1:30 PM  Out time: 2:36 PM 
Total Treatment Time (min): 66 minutes Total Timed Codes (min): 66 minutes 1:1 Treatment Time (MC only): 60 minutes Visit #: 73 Treatment Area: Pain in arm [Z12.663] SUBJECTIVE Pain Level (0-10 scale): 0.5/10 Any medication changes, allergies to medications, adverse drug reactions, diagnosis change, or new procedure performed?: [x] No    [] Yes (see summary sheet for update) Subjective functional status/changes:   [] No changes reported The Pt denies any significant changes from her last session. She continues to report diligence with her HEP. OBJECTIVE 66 min Therapeutic Exercise:  [x] See flow sheet :  
Rationale: increase ROM and increase strength to improve the patients ability to perform ADLs with less pain or discomfort. With 
 [x] TE 
 [] TA 
 [] neuro 
 [] other: Patient Education: [x] Review HEP [] Progressed/Changed HEP based on:  
[] positioning   [] body mechanics   [] transfers   [] heat/ice application   
[] other:   
 
Other Objective/Functional Measures: None noted Pain Level (0-10 scale) post treatment: 0/10 ASSESSMENT/Changes in Function: The Pt tolerated her therapy program well with less verbal cues to reduce UT compensation. She is progressing well with her scapular and rotator cuff strengthening exercises and anticipate discharge after next PT session.  
Patient will continue to benefit from skilled PT services to modify and progress therapeutic interventions, address functional mobility deficits, address ROM deficits, address strength deficits, analyze and address soft tissue restrictions, analyze and cue movement patterns, analyze and modify body mechanics/ergonomics, assess and modify postural abnormalities, address imbalance/dizziness and instruct in home and community integration to attain remaining goals. []  See Plan of Care 
[]  See progress note/recertification 
[]  See Discharge Summary Progress towards goals / Updated goals: 
Short Term Goals: To be accomplished in 6 treatments: 
1. The Pt will be independent and compliant with their HEP- met 2. The Pt will report a 50% reduction in their pain with ADLs- met Long Term Goals: To be accomplished in 12 treatments: 
1. The Pt will score the MCII on her FOTO survey demonstrating improved overall function (49 to 64 points)- progression FOTO 56/100 
2. The Pt will have >/= 165 degrees of L shoulder forward elevation with 0-2/10 pain to allow Pt to be able to perform overhead activities with less pain or discomfort- progressing 3. The Pt will be able to reach to T12 with her L arm with 0-2/10 pain to allow the Pt to be able to hook her bra with less pain or discomfort- met PLAN [x]  Upgrade activities as tolerated     [x]  Continue plan of care [x]  Update interventions per flow sheet      
[]  Discharge due to:_ 
[]  Other:_ Raghav Chin, PT 11/20/2018  2:35 PM

## 2018-11-29 ENCOUNTER — HOSPITAL ENCOUNTER (OUTPATIENT)
Dept: PHYSICAL THERAPY | Age: 78
Discharge: HOME | End: 2018-11-29
Payer: MEDICARE

## 2018-11-29 PROCEDURE — G8985 CARRY GOAL STATUS: HCPCS

## 2018-11-29 PROCEDURE — 97110 THERAPEUTIC EXERCISES: CPT

## 2018-11-29 PROCEDURE — G8986 CARRY D/C STATUS: HCPCS

## 2018-11-29 NOTE — PROGRESS NOTES
PT DAILY TREATMENT NOTE - [facility] 2-15 Patient Name: Laila Martinez Date:2018 : 1940 [x]  Patient  Verified Payor: [place] MEDICARE / Plan: Sherif Burroughs y / Product Type: Medicare / In time:1106  Out time:1201 Total Treatment Time (min): 55 Total Timed Codes (min): 55 
1:1 Treatment Time ( only): 35 Visit #: 22 Treatment Area: Bilateral shoulder pain [M25.511, M25.512] SUBJECTIVE Pain Level (0-10 scale): 0/10 Any medication changes, allergies to medications, adverse drug reactions, diagnosis change, or new procedure performed?: [x] No    [] Yes (see summary sheet for update) Subjective functional status/changes:   [] No changes reported Patient believes she is 70% better. Only experience pain at night. OBJECTIVE 55 min Therapeutic Exercise:  [x] See flow sheet :  
Rationale: increase ROM and increase strength to improve the patients ability to perform ADLs and reduce pain levels With 
 [] TE 
 [] TA 
 [] neuro 
 [] other: Patient Education: [x] Review HEP [] Progressed/Changed HEP based on:  
[] positioning   [] body mechanics   [] transfers   [] heat/ice application   
[] other:   
 
Other Objective/Functional Measures: FOTO:41/100, eval 49 Cervical AROM:                                                                                      
                      Flexion: 45                 
                      Extension: 09                                   
                      Side Bending: Right: 49                    Left:37                                  
                      Rotation: Right: 60                                      Left: 65                   
  
 
R Shoulder AROM: 
Flexion: 171 Abduction: 162 ER: T3 
IR: T9 
  
L Shoulder AROM: 
Flexion: 172 Abduction: 161 ER: T2 
IR: T9 
 
  
Pain Level (0-10 scale) post treatment: 0/10 ASSESSMENT/Changes in Function: Patient demonstrates improved ROM, proper posture, and improve scapular stability. []  See Plan of Care 
[]  See progress note/recertification 
[x]  See Discharge Summary Progress towards goals / Updated goals: 
 
Short Term Goals: To be accomplished in 6 treatments: 
1. The Pt will be independent and compliant with their HEP. met 2. The Pt will report a 50% reduction in their pain with ADLs. met Long Term Goals: To be accomplished in 12 treatments: 
1. The Pt will score the MCII on her FOTO survey demonstrating improved overall function (49 to 64 points). Not met 2. The Pt will have >/= 165 degrees of L shoulder forward elevation with 0-2/10 pain to allow Pt to be able to perform overhead activities with less pain or discomfort. met 3. The Pt will be able to reach to T12 with her L arm with 0-2/10 pain to allow the Pt to be able to hook her bra with less pain or discomfort. met PLAN 
[]  Upgrade activities as tolerated     []  Continue plan of care 
[]  Update interventions per flow sheet [x]  Discharge due to:_ 
[]  Other:_ Adele Abarca 11/29/2018  11:16 AM

## 2018-12-05 NOTE — ANCILLARY DISCHARGE INSTRUCTIONS
[facility] [address] ([place]), [address] [place] Phone: [phone] Fax: [phone] Discharge Summary 2-15 Patient name: Araseli Lay  : 1940  Provider#: [id number] Referral source: [facility] Lanezew[facility] Medical/Treatment Diagnosis: Bilateral shoulder pain [M25.511, M25.512] Prior Hospitalization: see medical history Comorbidities: See Plan of Care Prior Level of Function: See Plan of Care Medications: Verified on Patient Summary List 
 
Start of Care: 2018      Onset Date:2018 Visits from Start of Care: 12     Missed Visits: 3 Reporting Period : 2018 to 2018 Assessment/Summary of care: 
 Patient has completed 12 sessions of outpatient physical therapy for her bilateral shoulder pain. She has shown improved ROM allowing her to perform all her ADLs and self care with greater ease and no pain levels. She has strengthened her scapular retractors limiting her rounded shoulder posture. Will continue to perform HEP in order to maintain benefits of physical therapy. Short Term Goals: To be accomplished in 6 treatments: 
1. The Pt will be independent and compliant with their HEP. met 2. The Pt will report a 50% reduction in their pain with ADLs. met Long Term Goals: To be accomplished in 12 treatments: 
1. The Pt will score the MCII on her FOTO survey demonstrating improved overall function (49 to 64 points). Not met 2. The Pt will have >/= 165 degrees of L shoulder forward elevation with 0-2/10 pain to allow Pt to be able to perform overhead activities with less pain or discomfort. met 3. The Pt will be able to reach to T12 with her L arm with 0-2/10 pain to allow the Pt to be able to hook her bra with less pain or discomfort. met Other Objective/Functional Measures: FOTO:41/100, eval 49/100 
  
Cervical AROM:                                                                                       
                      WSDRCNW: 21                 
                      Extension: 01                                   
                      Side Bending: Right: 49                    Left:37                                  
                      Rotation: Right: 60                                      Left: 65                   
  
  
R Shoulder AROM: 
Flexion: 171 Abduction: 162 ER: T3 
IR: T9 
  
L Shoulder AROM: 
Flexion: 172 Abduction: 161 ER: T2 
IR: T9 
 
 
G-Codes (GP) Carry  Goal  CJ= 20-39%  D/C  CK= 40-59% The severity rating is based on clinical judgment and the FOTO Score score. RECOMMENDATIONS: 
[x]Discontinue therapy: [x]Patient has reached or is progressing toward set goals []Patient is non-compliant or has abdicated 
   []Due to lack of appreciable progress towards set goals []Other Velvet Jc, JUDAH 12/5/2018 2:12 PM

## 2018-12-05 NOTE — PROGRESS NOTES
[facility] [address] ([place]), [address] [place] Phone: [phone] Fax: [phone] Discharge Summary 2-15 Patient name: Frankie Estevez  : 1940  Provider#: [id number] Referral source: [facility]Treatment Diagnosis: Bilateral shoulder pain [M25.511, M25.512] Prior Hospitalization: see medical history Comorbidities: See Plan of Care Prior Level of Function: See Plan of Care Medications: Verified on Patient Summary List 
 
Start of Care: 2018      Onset Date:2018 Visits from Start of Care: 12     Missed Visits: 3 Reporting Period : 2018 to 2018 Assessment/Summary of care: 
 Patient has completed 12 sessions of outpatient physical therapy for her bilateral shoulder pain. She has shown improved ROM allowing her to perform all her ADLs and self care with greater ease and no pain levels. She has strengthened her scapular retractors limiting her rounded shoulder posture. Will continue to perform HEP in order to maintain benefits of physical therapy. Short Term Goals: To be accomplished in 6 treatments: 
1. The Pt will be independent and compliant with their HEP. met 2. The Pt will report a 50% reduction in their pain with ADLs. met Long Term Goals: To be accomplished in 12 treatments: 
1. The Pt will score the MCII on her FOTO survey demonstrating improved overall function (49 to 64 points). Not met 2. The Pt will have >/= 165 degrees of L shoulder forward elevation with 0-2/10 pain to allow Pt to be able to perform overhead activities with less pain or discomfort. met 3. The Pt will be able to reach to T12 with her L arm with 0-2/10 pain to allow the Pt to be able to hook her bra with less pain or discomfort. met Other Objective/Functional Measures: FOTO:41/100, eval 49/100 
  
Cervical AROM:                                                                                       
                      NBUVYZV: 69                 
                      Extension: 83                                   
                      Side Bending: Right: 49                    Left:37                                  
                      Rotation: Right: 60                                      Left: 65                   
  
  
R Shoulder AROM: 
Flexion: 171 Abduction: 162 ER: T3 
IR: T9 
  
L Shoulder AROM: 
Flexion: 172 Abduction: 161 ER: T2 
IR: T9 
 
 
G-Codes (GP) Carry  Goal  CJ= 20-39%  D/C  CK= 40-59% The severity rating is based on clinical judgment and the FOTO Score score. RECOMMENDATIONS: 
[x]Discontinue therapy: [x]Patient has reached or is progressing toward set goals []Patient is non-compliant or has abdicated 
   []Due to lack of appreciable progress towards set goals []Other Delia Tejeda, PT 12/5/2018 2:12 PM

## 2019-07-15 ENCOUNTER — OFFICE VISIT (OUTPATIENT)
Dept: URGENT CARE | Age: 79
End: 2019-07-15

## 2019-07-15 VITALS
TEMPERATURE: 98.7 F | SYSTOLIC BLOOD PRESSURE: 166 MMHG | DIASTOLIC BLOOD PRESSURE: 72 MMHG | HEIGHT: 60 IN | RESPIRATION RATE: 18 BRPM | HEART RATE: 84 BPM | OXYGEN SATURATION: 97 % | BODY MASS INDEX: 36.71 KG/M2 | WEIGHT: 187 LBS

## 2019-07-15 DIAGNOSIS — R22.1 MASS OF RIGHT SIDE OF NECK: Primary | ICD-10-CM

## 2019-07-15 RX ORDER — MELOXICAM 15 MG/1
15 TABLET ORAL DAILY
COMMUNITY

## 2019-07-15 NOTE — PATIENT INSTRUCTIONS
Please call your PCP TODAY to schedule follow up as soon as possible within next 1 week. Unsure what swelling is in neck but your doctor may consider ultrasound to look at this area. Please call their office/schedule appointment for further evaluation and management.    Return to office immediately for any new, worsening or changes

## 2019-07-15 NOTE — PROGRESS NOTES
Here for lump on right side of neck  Felt it for 1st time yesterday incidentally while rubbing neck  It is not painful or itchy. No known cause or how long it has been there. Worried about a bug bite so came to urgent care for evalution. Denies hx of cancer. Non smoker. Past Medical History:   Diagnosis Date    Anemia, iron deficiency     Asthma     Diabetes ([address]     HTN (hypertension)     Hypercholesteremia         No past surgical history on file.       Family History   Problem Relation Age of Onset    Heart Attack Other     Hypertension Other     Diabetes Other         Social History     Socioeconomic History    Marital status: SINGLE     Spouse name: Not on file    Number of children: Not on file    Years of education: Not on file    Highest education level: Not on file   Occupational History    Not on file   Social Needs    Financial resource strain: Not on file    Food insecurity:     Worry: Not on file     Inability: Not on file    Transportation needs:     Medical: Not on file     Non-medical: Not on file   Tobacco Use    Smoking status: Current Every Day Smoker     Packs/day: 0.50     Types: Cigarettes    Smokeless tobacco: Never Used   Substance and Sexual Activity    Alcohol use: No    Drug use: No    Sexual activity: Not on file   Lifestyle    Physical activity:     Days per week: Not on file     Minutes per session: Not on file    Stress: Not on file   Relationships    Social connections:     Talks on phone: Not on file     Gets together: Not on file     Attends Zoroastrianism service: Not on file     Active member of club or organization: Not on file     Attends meetings of clubs or organizations: Not on file     Relationship status: Not on file    Intimate partner violence:     Fear of current or ex partner: Not on file     Emotionally abused: Not on file     Physically abused: Not on file     Forced sexual activity: Not on file   Other Topics Concern    Not on file Social History Narrative    Not on file                ALLERGIES: Patient has no known allergies. Review of Systems   Constitutional: Negative for appetite change, chills, fatigue, fever and unexpected weight change. No night sweats   HENT: Negative for mouth sores, postnasal drip and voice change. Gastrointestinal: Negative for nausea and vomiting. Musculoskeletal: Negative for joint swelling, neck pain and neck stiffness. Skin: Negative for rash. Neurological: Negative for dizziness and weakness. All other systems reviewed and are negative. Vitals:    07/15/19 1445 07/15/19 1502   BP: 192/74 166/72   Pulse: 84    Resp: 18    Temp: 98.7 °F (37.1 °C)    SpO2: 97%    Weight: 187 lb (84.8 kg)    Height: 5' (1.524 m)        Physical Exam   Constitutional: She is oriented to person, place, and time. No distress. Neck: Neck supple. Normal carotid pulses present. Carotid bruit is not present. Cardiovascular: Normal rate, regular rhythm and normal heart sounds. Pulmonary/Chest: Effort normal and breath sounds normal. No respiratory distress. She has no wheezes. She has no rales. Neurological: She is alert and oriented to person, place, and time. Skin: She is not diaphoretic. Psychiatric: She has a normal mood and affect. Her behavior is normal. Thought content normal.       MDM     Differential Diagnosis; Clinical Impression; Plan:       CLINICAL IMPRESSION:  (R22.1) Mass of right side of neck  (primary encounter diagnosis)    Plan:  Please call your PCP TODAY to schedule follow up as soon as possible within next 1 week. Undetermined if lymph node (does not feel typical) or other mass  Please call their office/schedule appointment for further evaluation and management. We have reviewed concerning signs/symptoms, normal vs abnormal progression of medical condition and when to seek immediate medical attention.   Schedule with PCP or Urgent Care immediately for worsening or new symptoms.             Procedures

## 2019-07-23 ENCOUNTER — HOSPITAL ENCOUNTER (OUTPATIENT)
Dept: ULTRASOUND IMAGING | Age: 79
Discharge: HOME | End: 2019-07-23
Attending: PHYSICIAN ASSISTANT
Payer: MEDICARE

## 2019-07-23 DIAGNOSIS — R22.1 MASS IN NECK: ICD-10-CM

## 2019-07-23 PROCEDURE — 76536 US EXAM OF HEAD AND NECK: CPT

## 2020-03-03 ENCOUNTER — HOSPITAL ENCOUNTER (OUTPATIENT)
Dept: ULTRASOUND IMAGING | Age: 80
Discharge: HOME | End: 2020-03-03
Attending: INTERNAL MEDICINE
Payer: MEDICARE

## 2020-03-03 DIAGNOSIS — R79.89 HYPOURICEMIA: ICD-10-CM

## 2020-03-03 DIAGNOSIS — R68.89 MECHANICAL AND MOTOR PROBLEMS WITH INTERNAL ORGANS: ICD-10-CM

## 2020-03-03 PROCEDURE — 76705 ECHO EXAM OF ABDOMEN: CPT
